# Patient Record
Sex: FEMALE | Race: WHITE | Employment: FULL TIME | URBAN - NONMETROPOLITAN AREA
[De-identification: names, ages, dates, MRNs, and addresses within clinical notes are randomized per-mention and may not be internally consistent; named-entity substitution may affect disease eponyms.]

---

## 2022-02-16 PROBLEM — H65.03 NON-RECURRENT ACUTE SEROUS OTITIS MEDIA OF BOTH EARS: Status: ACTIVE | Noted: 2022-02-16

## 2023-09-19 ENCOUNTER — OFFICE VISIT (OUTPATIENT)
Dept: OBGYN CLINIC | Age: 23
End: 2023-09-19
Payer: COMMERCIAL

## 2023-09-19 VITALS
WEIGHT: 193 LBS | BODY MASS INDEX: 36.44 KG/M2 | SYSTOLIC BLOOD PRESSURE: 100 MMHG | HEIGHT: 61 IN | DIASTOLIC BLOOD PRESSURE: 62 MMHG | HEART RATE: 87 BPM

## 2023-09-19 DIAGNOSIS — F41.9 ANXIETY: ICD-10-CM

## 2023-09-19 DIAGNOSIS — N91.2 AMENORRHEA: Primary | ICD-10-CM

## 2023-09-19 DIAGNOSIS — Z32.00 POSSIBLE PREGNANCY: ICD-10-CM

## 2023-09-19 DIAGNOSIS — N91.2 AMENORRHEA: ICD-10-CM

## 2023-09-19 DIAGNOSIS — Z36.89 CONFIRM FETAL CARDIAC ACTIVITY USING ULTRASOUND: ICD-10-CM

## 2023-09-19 LAB
CONTROL: PRESENT
GONADOTROPIN, CHORIONIC (HCG) QUANT: ABNORMAL MIU/ML (ref 0–5.3)
PREGNANCY TEST URINE, POC: ABNORMAL

## 2023-09-19 PROCEDURE — 99214 OFFICE O/P EST MOD 30 MIN: CPT | Performed by: NURSE PRACTITIONER

## 2023-09-19 RX ORDER — BUSPIRONE HYDROCHLORIDE 5 MG/1
5 TABLET ORAL 2 TIMES DAILY
Qty: 60 TABLET | Refills: 0 | Status: SHIPPED | OUTPATIENT
Start: 2023-09-19 | End: 2023-10-19

## 2023-09-19 RX ORDER — PNV NO.95/FERROUS FUM/FOLIC AC 28MG-0.8MG
1 TABLET ORAL DAILY
Qty: 30 TABLET | Refills: 11 | Status: SHIPPED | OUTPATIENT
Start: 2023-09-19

## 2023-09-19 ASSESSMENT — ENCOUNTER SYMPTOMS
RESPIRATORY NEGATIVE: 1
EYES NEGATIVE: 1
ALLERGIC/IMMUNOLOGIC NEGATIVE: 1
GASTROINTESTINAL NEGATIVE: 1
DIARRHEA: 0
CONSTIPATION: 0

## 2023-09-19 NOTE — PROGRESS NOTES
Pt is here for confirmation states she has cramping and some implantation spotting but not so much this week. She is up to date on her pap.

## 2023-09-26 ENCOUNTER — TELEPHONE (OUTPATIENT)
Dept: OBGYN CLINIC | Age: 23
End: 2023-09-26

## 2023-09-26 NOTE — TELEPHONE ENCOUNTER
Patient called with c/o of brown discharge. Left message informing patient that this can be normal if she recently had intercourse. Also gave patient scheduling number to set up ultrasound.

## 2023-10-04 ENCOUNTER — HOSPITAL ENCOUNTER (OUTPATIENT)
Dept: ULTRASOUND IMAGING | Age: 23
Discharge: HOME | End: 2023-10-04
Payer: COMMERCIAL

## 2023-10-04 DIAGNOSIS — Z36.89 CONFIRM FETAL CARDIAC ACTIVITY USING ULTRASOUND: ICD-10-CM

## 2023-10-04 PROCEDURE — 76817 TRANSVAGINAL US OBSTETRIC: CPT

## 2023-10-09 ENCOUNTER — ROUTINE PRENATAL (OUTPATIENT)
Dept: OBGYN CLINIC | Age: 23
End: 2023-10-09

## 2023-10-09 VITALS
DIASTOLIC BLOOD PRESSURE: 82 MMHG | HEART RATE: 79 BPM | SYSTOLIC BLOOD PRESSURE: 116 MMHG | BODY MASS INDEX: 37.41 KG/M2 | WEIGHT: 198 LBS

## 2023-10-09 DIAGNOSIS — Z3A.10 10 WEEKS GESTATION OF PREGNANCY: ICD-10-CM

## 2023-10-09 DIAGNOSIS — Z34.01 NORMAL FIRST PREGNANCY IN FIRST TRIMESTER: Primary | ICD-10-CM

## 2023-10-09 LAB
ABO/RH: NORMAL
ANTIBODY SCREEN: NORMAL
HCV AB SERPL QL IA: NORMAL

## 2023-10-09 PROCEDURE — 0500F INITIAL PRENATAL CARE VISIT: CPT | Performed by: NURSE PRACTITIONER

## 2023-10-09 NOTE — PROGRESS NOTES
Pt presents today for routine prenatal visit. Pt denies vaginal bleeding, cramping, or leaking of fluid. She states that her step mom said that pregnant women were needing COVID and flu shots. She is not sure who she wants for delivery. She states she is wanting genetic testing and is up to date on her pap.

## 2023-10-12 LAB
BASOPHILS # BLD: 0 K/UL (ref 0–0.2)
BASOPHILS NFR BLD: 0.2 % (ref 0–1)
EOSINOPHIL # BLD: 0 K/UL (ref 0–0.6)
EOSINOPHIL NFR BLD: 0.5 % (ref 0–5)
ERYTHROCYTE [DISTWIDTH] IN BLOOD BY AUTOMATED COUNT: 12.4 % (ref 11.5–14.5)
HBV SURFACE AG SERPL QL IA: ABNORMAL
HCT VFR BLD AUTO: 39.3 % (ref 37–47)
HGB BLD-MCNC: 13.2 G/DL (ref 12–16)
HIV-1 P24 AG: ABNORMAL
HIV1+2 AB SERPLBLD QL IA.RAPID: ABNORMAL
IMM GRANULOCYTES # BLD: 0 K/UL
LYMPHOCYTES # BLD: 2.1 K/UL (ref 1.1–4.5)
LYMPHOCYTES NFR BLD: 24.5 % (ref 20–40)
MCH RBC QN AUTO: 30.4 PG (ref 27–31)
MCHC RBC AUTO-ENTMCNC: 33.6 G/DL (ref 33–37)
MCV RBC AUTO: 90.6 FL (ref 81–99)
MONOCYTES # BLD: 0.6 K/UL (ref 0–0.9)
MONOCYTES NFR BLD: 6.7 % (ref 0–10)
NEUTROPHILS # BLD: 5.9 K/UL (ref 1.5–7.5)
NEUTS SEG NFR BLD: 67.8 % (ref 50–65)
PLATELET # BLD AUTO: 252 K/UL (ref 130–400)
PMV BLD AUTO: 9.8 FL (ref 9.4–12.3)
RBC # BLD AUTO: 4.34 M/UL (ref 4.2–5.4)
RPR SER QL: REACTIVE
RUBV IGG SER-ACNC: REACTIVE [IU]/ML
WBC # BLD AUTO: 8.7 K/UL (ref 4.8–10.8)

## 2023-10-14 LAB — RPR SER-TITR: ABNORMAL {TITER}

## 2023-10-15 LAB — T PALLIDUM AB SER QL AGGL: NON REACTIVE

## 2023-10-16 LAB — VZV IGG SER QL IA: 1.21

## 2023-11-06 ENCOUNTER — ROUTINE PRENATAL (OUTPATIENT)
Dept: OBGYN CLINIC | Age: 23
End: 2023-11-06

## 2023-11-06 VITALS
BODY MASS INDEX: 38.55 KG/M2 | DIASTOLIC BLOOD PRESSURE: 80 MMHG | HEART RATE: 99 BPM | SYSTOLIC BLOOD PRESSURE: 131 MMHG | WEIGHT: 204 LBS

## 2023-11-06 DIAGNOSIS — R76.8 BIOLOGICAL FALSE POSITIVE RPR TEST: ICD-10-CM

## 2023-11-06 DIAGNOSIS — Z3A.14 14 WEEKS GESTATION OF PREGNANCY: ICD-10-CM

## 2023-11-06 DIAGNOSIS — Z34.01 NORMAL FIRST PREGNANCY IN FIRST TRIMESTER: Primary | ICD-10-CM

## 2023-11-06 PROCEDURE — 0502F SUBSEQUENT PRENATAL CARE: CPT | Performed by: NURSE PRACTITIONER

## 2023-11-06 NOTE — PROGRESS NOTES
Pt presents today for routine prenatal visit. Pt denies vaginal bleeding or leaking of fluid. She states she has some cramping sometimes when she rolls over or moves and is wanting to discuss weight.

## 2023-11-15 RX ORDER — BUSPIRONE HYDROCHLORIDE 5 MG/1
5 TABLET ORAL 2 TIMES DAILY
Qty: 60 TABLET | Refills: 11 | Status: SHIPPED | OUTPATIENT
Start: 2023-11-15

## 2023-12-04 ENCOUNTER — TELEPHONE (OUTPATIENT)
Dept: OBGYN CLINIC | Age: 23
End: 2023-12-04

## 2023-12-04 SDOH — ECONOMIC STABILITY: TRANSPORTATION INSECURITY
IN THE PAST 12 MONTHS, HAS LACK OF TRANSPORTATION KEPT YOU FROM MEETINGS, WORK, OR FROM GETTING THINGS NEEDED FOR DAILY LIVING?: NO

## 2023-12-04 SDOH — ECONOMIC STABILITY: INCOME INSECURITY: HOW HARD IS IT FOR YOU TO PAY FOR THE VERY BASICS LIKE FOOD, HOUSING, MEDICAL CARE, AND HEATING?: NOT VERY HARD

## 2023-12-04 SDOH — ECONOMIC STABILITY: FOOD INSECURITY: WITHIN THE PAST 12 MONTHS, YOU WORRIED THAT YOUR FOOD WOULD RUN OUT BEFORE YOU GOT MONEY TO BUY MORE.: NEVER TRUE

## 2023-12-04 SDOH — ECONOMIC STABILITY: FOOD INSECURITY: WITHIN THE PAST 12 MONTHS, THE FOOD YOU BOUGHT JUST DIDN'T LAST AND YOU DIDN'T HAVE MONEY TO GET MORE.: NEVER TRUE

## 2023-12-04 SDOH — ECONOMIC STABILITY: HOUSING INSECURITY
IN THE LAST 12 MONTHS, WAS THERE A TIME WHEN YOU DID NOT HAVE A STEADY PLACE TO SLEEP OR SLEPT IN A SHELTER (INCLUDING NOW)?: NO

## 2023-12-06 ENCOUNTER — ROUTINE PRENATAL (OUTPATIENT)
Dept: OBGYN CLINIC | Age: 23
End: 2023-12-06

## 2023-12-06 VITALS
SYSTOLIC BLOOD PRESSURE: 132 MMHG | DIASTOLIC BLOOD PRESSURE: 85 MMHG | WEIGHT: 203 LBS | HEART RATE: 99 BPM | BODY MASS INDEX: 38.36 KG/M2

## 2023-12-06 DIAGNOSIS — Z34.02 NORMAL FIRST PREGNANCY CONFIRMED, SECOND TRIMESTER: Primary | ICD-10-CM

## 2023-12-06 PROCEDURE — 0502F SUBSEQUENT PRENATAL CARE: CPT | Performed by: NURSE PRACTITIONER

## 2023-12-06 NOTE — PROGRESS NOTES
CNM Prenatal Office Note  Subjective:  Corean Cockayne is here for a return obstetrical visit. Today she is 18w3d weeks EGA. She is doing well, taking her PNV as directed, and has no complaints. She  does not have vaginal bleeding, n/v, syncope, or headaches. Pt does feel fetal movement regularly. Objective: Mother's Prenatal Vitals  BP: 132/85  Weight - Scale: 92.1 kg (203 lb)  Pulse: 99  Patient Position: Sitting  Prenatal Fetal Information  Fetal HR: 150  Movement: Present  Pt is A&Ox3, in no acute distress. Normocephalic, atraumatic. PERRL. Resp even and non-labored. Skin pink, warm & dry. Gravid abdomen. LANDIS's well. Gait steady. Assessment:    IUP at 18w3d wks      Diagnosis Orders   1. Normal first pregnancy confirmed, second trimester          Plan:  Pt counseled on balanced nutrition, adequate fluid intake, taking PNV daily, and exercise along with  upcoming anatomy scan  Continue with routine prenatal care  RTC in 4 wks for prenatal visit      MEDICATIONS:  No orders of the defined types were placed in this encounter. ORDERS:  No orders of the defined types were placed in this encounter. More than 50% of this 20 min visit was education and counseling.

## 2024-01-03 ENCOUNTER — ROUTINE PRENATAL (OUTPATIENT)
Dept: OBGYN CLINIC | Age: 24
End: 2024-01-03

## 2024-01-03 VITALS
BODY MASS INDEX: 38.92 KG/M2 | HEART RATE: 109 BPM | DIASTOLIC BLOOD PRESSURE: 81 MMHG | SYSTOLIC BLOOD PRESSURE: 134 MMHG | WEIGHT: 206 LBS

## 2024-01-03 DIAGNOSIS — F41.9 ANXIETY: ICD-10-CM

## 2024-01-03 DIAGNOSIS — R76.8 BIOLOGICAL FALSE POSITIVE RPR TEST: ICD-10-CM

## 2024-01-03 DIAGNOSIS — Z82.79 FAMILY HISTORY OF CONGENITAL HEART DEFECT: ICD-10-CM

## 2024-01-03 DIAGNOSIS — Z3A.22 22 WEEKS GESTATION OF PREGNANCY: ICD-10-CM

## 2024-01-03 DIAGNOSIS — Z34.02 ENCOUNTER FOR SUPERVISION OF NORMAL FIRST PREGNANCY IN SECOND TRIMESTER: Primary | ICD-10-CM

## 2024-01-03 PROCEDURE — 0502F SUBSEQUENT PRENATAL CARE: CPT | Performed by: NURSE PRACTITIONER

## 2024-01-03 NOTE — PROGRESS NOTES
Pt is here for routine parental care. Pt denies vaginal bleeding, cramping or leaking of fluid. Pt states + fetal movement.

## 2024-01-03 NOTE — PROGRESS NOTES
[facility] Prenatal Office Note  Subjective:  Valentine Hicks is here for a return obstetrical visit. Today she is 22w3d weeks EGA. She is doing well, taking her PNV as directed, and has no complaints.  She  does not have vaginal bleeding, n/v, syncope, or headaches.  Pt does feel fetal movement regularly.  Pt did have her anatomy scan today. Pt does complain of some sleeping difficulties.     Objective:  Mother's Prenatal Vitals  BP: 134/81  Weight - Scale: 93.4 kg (206 lb)  Pulse: (!) 109  Patient Position: Sitting  Prenatal Fetal Information  Fetal HR:   Movement: Present  Cervical Exam  Presentation: Cephalic  Pt is A&Ox3, in no acute distress. Normocephalic, atraumatic. PERRL. Resp even and non-labored. Skin pink, warm & dry. Gravid abdomen. LANDIS's well. Gait steady.   Assessment:    IUP at 22w3d wks      Diagnosis Orders   1. Encounter for supervision of normal first pregnancy in second trimester  Glucose 1 Hour Postprandial    CBC with Auto Differential      2. 22 weeks gestation of pregnancy        3. Anxiety        4. Biological false positive RPR test        5. Family history of congenital heart defect          Plan:  Pt counseled on balanced nutrition, adequate fluid intake, taking PNV daily, and exercise along with labor precautions, GHTN precautions, Kick count,  labor, and Genetic testing  Upcoming Glucose screening   Continue with routine prenatal care.  Return in about 4 weeks (around 2024) for prenatal.       MEDICATIONS:  No orders of the defined types were placed in this encounter.    ORDERS:  Orders Placed This Encounter   Procedures    Glucose 1 Hour Postprandial    CBC with Auto Differential       More than 50% of this 20 min visit was education and counseling.    I FILOMENA Meek, am scribing for and in the presence of Tiffany Ramos CNM.  1/10/2024 11:38 PM     I have seen and examined the patient independently.  I reviewed all laboratory and imaging studies that are relevant.

## 2024-01-03 NOTE — PATIENT INSTRUCTIONS
Patient Education        Weeks 22 to 26 of Your Pregnancy: Care Instructions  Your baby's lungs are getting ready for breathing. Your baby may respond to your voice. Your baby likely turns less, and kicks or jerks more. Jerking may mean that your baby has hiccups.    Think about taking childbirth classes. And start to think about whether you want to have pain medicine during labor.   At your next doctor visit, you may be tested for anemia and for high blood sugar that first occurs during pregnancy (gestational diabetes). These conditions can cause problems for you and your baby.     To ease discomfort, such as back pain    Change your position often. Try not to sit or stand for too long.  Get some exercise. Things like walking or stretching may help.  Try using a heating pad or cold pack.    To ease or reduce swelling in your feet, ankles, hands, and fingers    Take off your rings.  Avoid high-sodium foods, such as potato chips.  Prop up your feet, and sleep with pillows under your feet.  Try to avoid standing for long periods of time.  Do not wear tight shoes.  Wear support stockings.  Kegel exercises to prevent urine from leaking    Squeeze your muscles as if you were trying not to pass gas. Your belly, legs, and buttocks shouldn't move. Hold the squeeze for 3 seconds, then relax for 5 to 10 seconds.    Add 1 second each week until you can squeeze for 10 seconds. Repeat the exercise 10 times a session. Do 3 to 8 sessions a day. If these exercises cause you pain, stop doing them and talk with your doctor.  Follow-up care is a key part of your treatment and safety. Be sure to make and go to all appointments, and call your doctor if you are having problems. It's also a good idea to know your test results and keep a list of the medicines you take.  Where can you learn more?  Go to https://www.healthwise.net/patientEd and enter G264 to learn more about \"Weeks 22 to 26 of Your Pregnancy: Care Instructions.\"  Current as

## 2024-01-09 ENCOUNTER — TELEPHONE (OUTPATIENT)
Dept: OBGYN CLINIC | Age: 24
End: 2024-01-09

## 2024-01-09 NOTE — TELEPHONE ENCOUNTER
Valentine called to reschedule an appointment for Follow Up for her prenatal and ultrasound that is on 1/31/24. [facility] was unable to accommodate in the time frame needed. Please be advised that the best time to call Anytime.    Thank you.

## 2024-01-13 ENCOUNTER — OFFICE VISIT (OUTPATIENT)
Age: 24
End: 2024-01-13
Payer: COMMERCIAL

## 2024-01-13 VITALS
RESPIRATION RATE: 20 BRPM | BODY MASS INDEX: 39.53 KG/M2 | WEIGHT: 209.2 LBS | SYSTOLIC BLOOD PRESSURE: 122 MMHG | HEART RATE: 88 BPM | DIASTOLIC BLOOD PRESSURE: 72 MMHG | OXYGEN SATURATION: 98 % | TEMPERATURE: 97.3 F

## 2024-01-13 DIAGNOSIS — B35.4 TINEA CORPORIS: Primary | ICD-10-CM

## 2024-01-13 DIAGNOSIS — Z86.19 HISTORY OF TINEA CORPORIS: ICD-10-CM

## 2024-01-13 PROCEDURE — 99203 OFFICE O/P NEW LOW 30 MIN: CPT

## 2024-01-13 RX ORDER — KETOCONAZOLE 20 MG/G
CREAM TOPICAL
Qty: 30 G | Refills: 0 | Status: SHIPPED | OUTPATIENT
Start: 2024-01-13

## 2024-01-13 ASSESSMENT — ENCOUNTER SYMPTOMS
EYE REDNESS: 0
BACK PAIN: 0
COUGH: 0
CHEST TIGHTNESS: 0
SINUS PAIN: 0
SHORTNESS OF BREATH: 0
WHEEZING: 0
VOMITING: 0
EYE DISCHARGE: 0
DIARRHEA: 0
SORE THROAT: 0
NAUSEA: 0
CONSTIPATION: 0
EYE ITCHING: 0
RHINORRHEA: 0
ABDOMINAL PAIN: 0
ALLERGIC/IMMUNOLOGIC NEGATIVE: 1

## 2024-01-13 NOTE — PROGRESS NOTES
[facility]  [facility]  [address] [place] [address] [address]IVE  [place] [address]  Dept: [phone]  Dept Fax: [phone]  Loc: [phone]    Valentine Hicks is a 23 y.o. female who presents today for her medical conditions/complaints as noted below.  Valentine Hicks is complaining of Skin Problem (Itchy, redness patch since this mor)    HPI:     Patient ambulatory to clinic for complaint of skin concern to left breast. She states she awoke this morning to raised red patch on left breast. Mild pruritus. Denies discharge or drainage. States she had ringworm in 4th grade on one of her breasts but does not remember which one. Has not taken any medications for her symptoms. She also reports moving; therefore her laundry has not been as clean as usual. Patient is 23w 6d pregnant. Stable.     Past Medical History:   Diagnosis Date    ADHD (attention deficit hyperactivity disorder)     Anxiety 1/14/2019     History reviewed. No pertinent surgical history.    History reviewed. No pertinent family history.    Social History     Tobacco Use    Smoking status: Never    Smokeless tobacco: Never   Substance Use Topics    Alcohol use: No      Current Outpatient Medications   Medication Sig Dispense Refill    ketoconazole (NIZORAL) 2 % cream Apply to affected area two times daily for 4 weeks 30 g 0    busPIRone (BUSPAR) 5 MG tablet TAKE ONE TABLET BY MOUTH 2 TIMES A DAY 60 tablet 11    Prenatal Vit-Fe Fumarate-FA (PRENATAL VITAMINS) 28-0.8 MG TABS Take 1 tablet by mouth daily 30 tablet 11     No current facility-administered medications for this visit.     No Known Allergies    Health Maintenance   Topic Date Due    Hepatitis B vaccine (1 of 3 - 3-dose series) Never done    Varicella vaccine (1 of 2 - 2-dose childhood series) Never done    HPV vaccine (1 - 2-dose series) Never done    Depression Screen  Never done    HIV screen  Never done    DTaP/Tdap/Td vaccine (1 - Tdap) Never done    Pap

## 2024-01-13 NOTE — PATIENT INSTRUCTIONS
Ketoconazole cream prescribed.  Avoid touching/scratching the area.  Wash hands frequently.  Wash all clothes/linens in hot water.  Contact precautions  Follow up with PCP or return to clinic if symptoms worsening or not improving.

## 2024-02-01 ENCOUNTER — ROUTINE PRENATAL (OUTPATIENT)
Dept: OBGYN CLINIC | Age: 24
End: 2024-02-01

## 2024-02-01 VITALS
SYSTOLIC BLOOD PRESSURE: 111 MMHG | BODY MASS INDEX: 39.68 KG/M2 | WEIGHT: 210 LBS | HEART RATE: 92 BPM | DIASTOLIC BLOOD PRESSURE: 76 MMHG

## 2024-02-01 DIAGNOSIS — Z34.02 ENCOUNTER FOR SUPERVISION OF NORMAL FIRST PREGNANCY IN SECOND TRIMESTER: Primary | ICD-10-CM

## 2024-02-01 DIAGNOSIS — Z34.02 ENCOUNTER FOR SUPERVISION OF NORMAL FIRST PREGNANCY IN SECOND TRIMESTER: ICD-10-CM

## 2024-02-01 DIAGNOSIS — Z3A.26 26 WEEKS GESTATION OF PREGNANCY: ICD-10-CM

## 2024-02-01 DIAGNOSIS — Z82.79 FAMILY HISTORY OF CONGENITAL HEART DEFECT: ICD-10-CM

## 2024-02-01 DIAGNOSIS — R76.8 BIOLOGICAL FALSE POSITIVE RPR TEST: ICD-10-CM

## 2024-02-01 DIAGNOSIS — Z13.32 ENCOUNTER FOR SCREENING FOR MATERNAL DEPRESSION: ICD-10-CM

## 2024-02-01 DIAGNOSIS — Z71.89 ENCOUNTER FOR ANTEPARTUM CONSULTATION REGARDING LACTATION: ICD-10-CM

## 2024-02-01 LAB
BASOPHILS # BLD: 0 K/UL (ref 0–0.2)
BASOPHILS NFR BLD: 0.2 % (ref 0–1)
EOSINOPHIL # BLD: 0 K/UL (ref 0–0.6)
EOSINOPHIL NFR BLD: 0.5 % (ref 0–5)
ERYTHROCYTE [DISTWIDTH] IN BLOOD BY AUTOMATED COUNT: 12.8 % (ref 11.5–14.5)
GLUCOSE 1H P MEAL SERPL-MCNC: 99 MG/DL (ref 75–140)
HCT VFR BLD AUTO: 36.8 % (ref 37–47)
HGB BLD-MCNC: 12.5 G/DL (ref 12–16)
IMM GRANULOCYTES # BLD: 0.1 K/UL
LYMPHOCYTES # BLD: 1.6 K/UL (ref 1.1–4.5)
LYMPHOCYTES NFR BLD: 20.1 % (ref 20–40)
MCH RBC QN AUTO: 30.4 PG (ref 27–31)
MCHC RBC AUTO-ENTMCNC: 34 G/DL (ref 33–37)
MCV RBC AUTO: 89.5 FL (ref 81–99)
MONOCYTES # BLD: 0.5 K/UL (ref 0–0.9)
MONOCYTES NFR BLD: 6.4 % (ref 0–10)
NEUTROPHILS # BLD: 5.9 K/UL (ref 1.5–7.5)
NEUTS SEG NFR BLD: 72.2 % (ref 50–65)
PLATELET # BLD AUTO: 237 K/UL (ref 130–400)
PMV BLD AUTO: 9.5 FL (ref 9.4–12.3)
RBC # BLD AUTO: 4.11 M/UL (ref 4.2–5.4)
WBC # BLD AUTO: 8.1 K/UL (ref 4.8–10.8)

## 2024-02-01 PROCEDURE — 0502F SUBSEQUENT PRENATAL CARE: CPT | Performed by: NURSE PRACTITIONER

## 2024-02-01 PROCEDURE — S3005 EVAL SELF-ASSESS DEPRESSION: HCPCS | Performed by: NURSE PRACTITIONER

## 2024-02-01 NOTE — PROGRESS NOTES
CNM Prenatal Office Note  Subjective:  Valentine Hicks is here for a return obstetrical visit. Today she is 26w4d weeks EGA. She is doing well, taking her PNV as directed, and has no complaints.  She  does not have vaginal bleeding, n/v, syncope, or headaches.  Pt does feel fetal movement regularly. 1 hour GCT today. Rhogam A pos  Objective:  Mother's Prenatal Vitals  BP: 111/76  Weight - Scale: 95.3 kg (210 lb)  Pulse: 92  Patient Position: Sitting  Prenatal Fetal Information  Fundal Height (cm): 29 cm  Fetal HR: 150- u/s  Movement: Present  Pt is A&Ox3, in no acute distress. Normocephalic, atraumatic. PERRL. Resp even and non-labored. Skin pink, warm & dry. Gravid abdomen. LANDIS's well. Gait steady. EPDS Screenin  Assessment:    IUP at 26w4d wks      Diagnosis Orders   1. Encounter for supervision of normal first pregnancy in second trimester        2. 26 weeks gestation of pregnancy        3. Biological false positive RPR test        4. Family history of congenital heart defect        5. Encounter for screening for maternal depression  MA EVAL SELF-ASSESS DEPRESSION      6. Encounter for antepartum consultation regarding lactation          Plan:  Third trimester teaching completed including warning signs for pre-eclampsia (blurred vision, seeing spots/sparkles, sudden increased weight gain or profound edema, epigastric pain), FKC (decreased fetal movments),  labor ( contractions or watery discharge, vaginal bleeding, cramping), n/v, chills, and or fever. Instructed pt to come to office or go to LDR if these symptoms presented. Pt voiced understanding.     Pt counseled on balanced nutrition, adequate fluid intake, taking PNV daily, and exercise along with GHTN precautions, Kick count, and  labor  Continue with routine prenatal care.  Return in about 2 weeks (around 2/15/2024) for prenatal.   Pt states her mood is good, she recently has been able to get WIC and medicaid. Pt states that will

## 2024-02-01 NOTE — PROGRESS NOTES
Pt is here for routine parental care. Pt denies vaginal bleeding, cramping or leaking of fluid. Pt states + fetal movement. She has order a breast pump.

## 2024-02-09 PROBLEM — Z82.79 FAMILY HISTORY OF CONGENITAL HEART DEFECT: Status: ACTIVE | Noted: 2024-02-09

## 2024-02-15 ENCOUNTER — ROUTINE PRENATAL (OUTPATIENT)
Dept: OBGYN CLINIC | Age: 24
End: 2024-02-15

## 2024-02-15 VITALS
DIASTOLIC BLOOD PRESSURE: 77 MMHG | SYSTOLIC BLOOD PRESSURE: 114 MMHG | WEIGHT: 211 LBS | BODY MASS INDEX: 39.87 KG/M2 | HEART RATE: 80 BPM

## 2024-02-15 DIAGNOSIS — O99.210 OBESITY AFFECTING PREGNANCY, ANTEPARTUM, UNSPECIFIED OBESITY TYPE: ICD-10-CM

## 2024-02-15 DIAGNOSIS — Z82.79 FAMILY HISTORY OF CONGENITAL HEART DEFECT: ICD-10-CM

## 2024-02-15 DIAGNOSIS — Z34.03 ENCOUNTER FOR SUPERVISION OF NORMAL FIRST PREGNANCY IN THIRD TRIMESTER: ICD-10-CM

## 2024-02-15 DIAGNOSIS — R76.8 BIOLOGICAL FALSE POSITIVE RPR TEST: ICD-10-CM

## 2024-02-15 DIAGNOSIS — Z3A.28 28 WEEKS GESTATION OF PREGNANCY: Primary | ICD-10-CM

## 2024-02-15 PROCEDURE — 0502F SUBSEQUENT PRENATAL CARE: CPT | Performed by: NURSE PRACTITIONER

## 2024-02-15 NOTE — PROGRESS NOTES
CNM Prenatal Office Note  Subjective:  Valentnie Hicks is here for a return obstetrical visit. Today she is 28w4d weeks EGA. She is doing well, taking her PNV as directed, and has no complaints.  She  does not have vaginal bleeding, n/v, syncope, or headaches.  Pt does feel fetal movement regularly.    Objective:  Mother's Prenatal Vitals  BP: 114/77  Weight - Scale: 95.7 kg (211 lb)  Pulse: 80  Patient Position: Sitting  Prenatal Fetal Information  Movement: Present  Pt is A&Ox3, in no acute distress. Normocephalic, atraumatic. PERRL. Resp even and non-labored. Skin pink, warm & dry. Gravid abdomen. LANDIS's well. Gait steady.   Assessment:    IUP at 28w4d wks      Diagnosis Orders   1. 28 weeks gestation of pregnancy        2. Biological false positive RPR test        3. Family history of congenital heart defect        4. Encounter for supervision of normal first pregnancy in third trimester        5. Obesity affecting pregnancy, antepartum, unspecified obesity type          Plan:  Pt counseled on balanced nutrition, adequate fluid intake, taking PNV daily, and exercise along with GHTN precautions, Kick count, and  labor  Continue with routine prenatal care.   surveillance indicated for obesity   next growth  Return in about 2 weeks (around 2024) for Prenatal.     MEDICATIONS:  No orders of the defined types were placed in this encounter.    ORDERS:  No orders of the defined types were placed in this encounter.      More than 50% of this 20 min visit was education and counseling.

## 2024-02-15 NOTE — PATIENT INSTRUCTIONS
Patient Education        Weeks 26 to 30 of Your Pregnancy: Care Instructions  You're starting your last trimester. You'll probably feel your baby moving around more. Your back may ache as your body gets used to your baby's size and length. Take care of yourself, and pay attention to what your body needs.    Talk to your doctor about getting the Tdap shot. It will help protect your  against whooping cough (pertussis). Also ask your doctor about flu and COVID-19 shots if you haven't had them yet. If your blood type is Rh negative, you may be given a shot of Rh immune globulin (such as RhoGAM). It can help prevent problems for your baby.   You may have Micro-Altman contractions. They are single or several strong contractions without a pattern. These are practice contractions but not the start of labor.   Be kind to yourself.     Take breaks when you're tired.  Change positions often. Don't sit for too long or stand for too long.  At work, rest during breaks if you can. If you don't get breaks, talk to your doctor about writing a letter to your employer to request them.  Avoid fumes, chemicals, and tobacco smoke.  Be sexual if you want to.     You may be interested in sex, or you may not. Everyone is different.  Sex is okay unless your doctor tells you not to.  Your belly can make it hard to find good positions for sex. Mountain and explore.  Watch for signs of  labor.    These signs include:   Menstrual-like cramps. Or you may have pain or pressure in your pelvis that happens in a pattern.  About 6 or more contractions in an hour (even after rest and a glass of water).  A low, dull backache that doesn't go away when you change positions.  An increase or change in vaginal discharge.  Light vaginal bleeding or spotting.  Your water breaking.  Know what to do if you think you are having contractions.     Drink 1 or 2 glasses of water.  Lie down on your left side for at least an hour.  While on your side,

## 2024-02-29 ENCOUNTER — ROUTINE PRENATAL (OUTPATIENT)
Dept: OBGYN CLINIC | Age: 24
End: 2024-02-29

## 2024-02-29 VITALS
DIASTOLIC BLOOD PRESSURE: 80 MMHG | SYSTOLIC BLOOD PRESSURE: 130 MMHG | BODY MASS INDEX: 40.06 KG/M2 | WEIGHT: 212 LBS | HEART RATE: 87 BPM

## 2024-02-29 DIAGNOSIS — Z3A.30 30 WEEKS GESTATION OF PREGNANCY: ICD-10-CM

## 2024-02-29 DIAGNOSIS — R76.8 BIOLOGICAL FALSE POSITIVE RPR TEST: ICD-10-CM

## 2024-02-29 DIAGNOSIS — O99.210 OBESITY AFFECTING PREGNANCY, ANTEPARTUM, UNSPECIFIED OBESITY TYPE: ICD-10-CM

## 2024-02-29 DIAGNOSIS — Z82.79 FAMILY HISTORY OF CONGENITAL HEART DEFECT: ICD-10-CM

## 2024-02-29 DIAGNOSIS — Z34.03 ENCOUNTER FOR SUPERVISION OF NORMAL FIRST PREGNANCY IN THIRD TRIMESTER: Primary | ICD-10-CM

## 2024-02-29 PROCEDURE — 0502F SUBSEQUENT PRENATAL CARE: CPT | Performed by: NURSE PRACTITIONER

## 2024-02-29 NOTE — PROGRESS NOTES
CNM Prenatal Office Note  Subjective:  Valentine Hicks is here for a return obstetrical visit. Today she is 30w4d weeks EGA. She is doing well, taking her PNV as directed, and has no complaints.  She  does not have vaginal bleeding, n/v, syncope, or headaches.  Pt does feel fetal movement regularly.    Objective:  Mother's Prenatal Vitals  BP: 130/80  Weight - Scale: 96.2 kg (212 lb)  Pulse: 87  Patient Position: Sitting  Prenatal Fetal Information  Fundal Height (cm): 32 cm  Fetal HR: 158  Movement: Present  Pt is A&Ox3, in no acute distress. Normocephalic, atraumatic. PERRL. Resp even and non-labored. Skin pink, warm & dry. Gravid abdomen. LANDIS's well. Gait steady.   Assessment:    IUP at 30w4d wks      Diagnosis Orders   1. Encounter for supervision of normal first pregnancy in third trimester        2. 30 weeks gestation of pregnancy        3. Biological false positive RPR test        4. Family history of congenital heart defect        5. Obesity affecting pregnancy, antepartum, unspecified obesity type          Plan:  Pt counseled on balanced nutrition, adequate fluid intake, taking PNV daily, and exercise along with GHTN precautions, Kick count, and  labor  Continue with routine prenatal care.   surveillance not indicated  Repeat anatomy reviewed and complete  Return in about 2 weeks (around 3/14/2024) for prenatal.     MEDICATIONS:  No orders of the defined types were placed in this encounter.    ORDERS:  No orders of the defined types were placed in this encounter.      More than 50% of this 20 min visit was education and counseling.

## 2024-02-29 NOTE — PATIENT INSTRUCTIONS
Patient Education        Weeks 30 to 32 of Your Pregnancy: Care Instructions  Your baby is growing more every day. Its eyes can open and close, and it may have hair on its head. Your baby may sleep 20 to 45 minutes at a time and is more active at certain times.    You should feel your baby move several times every day. Your baby now turns less and kicks more.   This is a good time to tour your hospital or birthing center. You may also want to find childcare if needed.     To ease heartburn    Avoid foods that make your symptoms worse, such as chocolate, spicy foods, and caffeine.  Avoid bending over or lying down after meals.  Do not eat for 2 hours before bedtime.  Take antacids like Tums, but don't take ones that have sodium bicarbonate, magnesium trisilicate, or aspirin.    To care for large, swollen veins (varicose veins)    Try to avoid standing for long periods of time.  Sit with your feet propped up.  Wear support hose.  Get some exercise every day, like walking or swimming.  Counting your baby's kicks  Your doctor may ask you to count your baby's movements, such as kicks, flutters, or rolls.    Find a quiet place, and get comfortable. Write down your start time. Count your baby's movements (except hiccups). When your baby has moved 10 times, you can stop counting. Write down how many minutes it took.   If an hour goes by and you don't feel 10 movements, have something to eat or drink. Count for another hour. If you don't feel at least 10 movements in the 2-hour period, call your doctor.   Follow-up care is a key part of your treatment and safety. Be sure to make and go to all appointments, and call your doctor if you are having problems. It's also a good idea to know your test results and keep a list of the medicines you take.  Where can you learn more?  Go to https://www.[employer].net/patientEd and enter X471 to learn more about \"Weeks 30 to 32 of Your Pregnancy: Care Instructions.\"  Current as of: July

## 2024-02-29 NOTE — PROGRESS NOTES
Pt is here for routine parental care. Pt denies vaginal bleeding, cramping or leaking of fluid. Pt states + fetal movement. She noticed a dry eczema patch around the nipple area, she went to pcp and they gave her topical cream and did it for 2 weeks it didn't go away but didn't get worse.

## 2024-03-14 ENCOUNTER — ROUTINE PRENATAL (OUTPATIENT)
Dept: OBGYN CLINIC | Age: 24
End: 2024-03-14

## 2024-03-14 VITALS
BODY MASS INDEX: 40.62 KG/M2 | WEIGHT: 215 LBS | SYSTOLIC BLOOD PRESSURE: 119 MMHG | HEART RATE: 98 BPM | DIASTOLIC BLOOD PRESSURE: 78 MMHG

## 2024-03-14 DIAGNOSIS — Z34.03 ENCOUNTER FOR SUPERVISION OF NORMAL FIRST PREGNANCY IN THIRD TRIMESTER: ICD-10-CM

## 2024-03-14 DIAGNOSIS — Z82.79 FAMILY HISTORY OF CONGENITAL HEART DEFECT: ICD-10-CM

## 2024-03-14 DIAGNOSIS — L30.9 DERMATITIS: ICD-10-CM

## 2024-03-14 DIAGNOSIS — O99.210 OBESITY AFFECTING PREGNANCY, ANTEPARTUM, UNSPECIFIED OBESITY TYPE: ICD-10-CM

## 2024-03-14 DIAGNOSIS — Z3A.32 32 WEEKS GESTATION OF PREGNANCY: Primary | ICD-10-CM

## 2024-03-14 DIAGNOSIS — R76.8 BIOLOGICAL FALSE POSITIVE RPR TEST: ICD-10-CM

## 2024-03-14 PROCEDURE — 0502F SUBSEQUENT PRENATAL CARE: CPT | Performed by: NURSE PRACTITIONER

## 2024-03-14 NOTE — PROGRESS NOTES
[facility] Prenatal Office Note  Subjective:  Valentine Hicks is here for a return obstetrical visit. Today she is 32w4d weeks EGA. She is doing well, taking her PNV as directed, and has no complaints.  She  does not have vaginal bleeding, n/v, syncope, or headaches.  Pt does feel fetal movement regularly.    Objective:  Mother's Prenatal Vitals  BP: 119/78  Weight - Scale: 97.5 kg (215 lb)  Pulse: 98  Patient Position: Sitting  Pt is A&Ox3, in no acute distress. Normocephalic, atraumatic. PERRL. Resp even and non-labored. Skin pink, warm & dry. Gravid abdomen. LANDIS's well. Gait steady.   Assessment:    IUP at 32w4d wks      Diagnosis Orders   1. 32 weeks gestation of pregnancy        2. Biological false positive RPR test        3. Family history of congenital heart defect        4. Encounter for supervision of normal first pregnancy in third trimester        5. Obesity affecting pregnancy, antepartum, unspecified obesity type        6. Dermatitis - dermatitis on left breast, I do recommend hydrocortisone cream.          Plan:  Pt counseled on balanced nutrition, adequate fluid intake, taking PNV daily, and exercise along with GHTN precautions, Kick count, and  labor  Continue with routine prenatal care.   surveillance not indicated  Return in about 2 weeks (around 3/28/2024) for prenatal.     MEDICATIONS:  No orders of the defined types were placed in this encounter.    ORDERS:  No orders of the defined types were placed in this encounter.      More than 50% of this 20 min visit was education and counseling.      I FILOMENA Meek, am scribing for and in the presence of SOLOMON Dowell.  3/14/24  10:49 AM CDT     I have seen and examined the patient independently.  I reviewed all laboratory and imaging studies that are relevant.  I have reviewed and made any appropriate changes to the HPI.    Electronically signed by LEYDA Alva CNM on 3/19/24  at 11:01 PM CDT

## 2024-03-27 ENCOUNTER — ROUTINE PRENATAL (OUTPATIENT)
Dept: OBGYN CLINIC | Age: 24
End: 2024-03-27

## 2024-03-27 VITALS
DIASTOLIC BLOOD PRESSURE: 81 MMHG | WEIGHT: 221 LBS | SYSTOLIC BLOOD PRESSURE: 127 MMHG | HEART RATE: 73 BPM | BODY MASS INDEX: 41.76 KG/M2

## 2024-03-27 DIAGNOSIS — Z82.79 FAMILY HISTORY OF CONGENITAL HEART DEFECT: ICD-10-CM

## 2024-03-27 DIAGNOSIS — M79.89 SWELLING OF BOTH HANDS: ICD-10-CM

## 2024-03-27 DIAGNOSIS — Z34.03 ENCOUNTER FOR SUPERVISION OF NORMAL FIRST PREGNANCY IN THIRD TRIMESTER: ICD-10-CM

## 2024-03-27 DIAGNOSIS — R76.8 BIOLOGICAL FALSE POSITIVE RPR TEST: ICD-10-CM

## 2024-03-27 DIAGNOSIS — Z3A.34 34 WEEKS GESTATION OF PREGNANCY: Primary | ICD-10-CM

## 2024-03-27 DIAGNOSIS — O99.210 OBESITY AFFECTING PREGNANCY, ANTEPARTUM, UNSPECIFIED OBESITY TYPE: ICD-10-CM

## 2024-03-27 NOTE — PATIENT INSTRUCTIONS
and keep a list of the medicines you take.  Where can you learn more?  Go to https://www.[facility].net/patientEd and enter B912 to learn more about \"Weeks 34 to 36 of Your Pregnancy: Care Instructions.\"  Current as of: July 10, 2023               Content Version: 14.0  © 1727-1886 [employer].   Care instructions adapted under license by [employer]. If you have questions about a medical condition or this instruction, always ask your healthcare professional. [employer] disclaims any warranty or liability for your use of this information.       Patient Education        Counting Your Baby's Kicks: Care Instructions  Overview     Counting your baby's kicks is one way your doctor can tell that your baby is healthy. You will probably feel your baby move for the first time between 16 and 22 weeks. The movement may feel like flutters rather than kicks. Your baby may move more at certain times of the day. When you are active, you may notice less kicking than when you are resting. At your prenatal visits, your doctor will ask whether the baby is active.  In your last trimester, your doctor may ask you to count the number of times you feel your baby move.  Follow-up care is a key part of your treatment and safety. Be sure to make and go to all appointments, and call your doctor if you are having problems. It's also a good idea to know your test results and keep a list of the medicines you take.  How do you count fetal kicks?  A common method of checking your baby's movement is to note the length of time it takes to count 10 movements (such as kicks, flutters, or rolls).  Pick your baby's most active time of day to count. This may be any time from morning to evening.  If you don't feel 10 movements in an hour, have something to eat or drink and count for another hour. If you don't feel at least 10 movements in the 2-hour period, call your doctor.  Do not use an at-home Doppler heart monitor in

## 2024-03-27 NOTE — PROGRESS NOTES
Pt is here for routine parental care. Pt denies vaginal bleeding, cramping or leaking of fluid. Pt states + fetal movement. She complains of her hands swelling.

## 2024-03-27 NOTE — PROGRESS NOTES
[facility] Prenatal Office Note  Subjective:  Valentine Hicks is here for a return obstetrical visit. Today she is 34w3d weeks EGA. She is doing well, taking her PNV as directed, and has no complaints.  She  does not have vaginal bleeding, n/v, syncope, or headaches.  Pt does feel fetal movement regularly.    Objective:  Mother's Prenatal Vitals  BP: 127/81  Weight - Scale: 100.2 kg (221 lb)  Pulse: 73  Patient Position: Sitting  Prenatal Fetal Information  Movement: Present  Pt is A&Ox3, in no acute distress. Normocephalic, atraumatic. PERRL. Resp even and non-labored. Skin pink, warm & dry. Gravid abdomen. LANDIS's well. Gait steady.   Assessment:    IUP at 34w3d wks      Diagnosis Orders   1. 34 weeks gestation of pregnancy        2. Biological false positive RPR test        3. Family history of congenital heart defect        4. Encounter for supervision of normal first pregnancy in third trimester        5. Obesity affecting pregnancy, antepartum, unspecified obesity type        6. Swelling of both hands          Plan:  Pt counseled on balanced nutrition, adequate fluid intake, taking PNV daily, and exercise along with GHTN precautions, Kick count, and  labor  Continue with routine prenatal care.   surveillance not indicated  Return in about 2 weeks (around 4/10/2024) for prenatal.     MEDICATIONS:  No orders of the defined types were placed in this encounter.    ORDERS:  No orders of the defined types were placed in this encounter.      More than 50% of this 20 min visit was education and counseling.      I FILOMENA Meek, am scribing for and in the presence of SOLOMON Dowell.  3/27/24  11:21 AM CDT     I have seen and examined the patient independently.  I reviewed all laboratory and imaging studies that are relevant.  I have reviewed and made any appropriate changes to the HPI.    Electronically signed by LEYDA Alva CNM on 3/31/24  at 7:58 PM CDT

## 2024-04-05 ENCOUNTER — ROUTINE PRENATAL (OUTPATIENT)
Dept: OBGYN CLINIC | Age: 24
End: 2024-04-05

## 2024-04-05 VITALS
BODY MASS INDEX: 41.76 KG/M2 | HEART RATE: 96 BPM | SYSTOLIC BLOOD PRESSURE: 139 MMHG | DIASTOLIC BLOOD PRESSURE: 77 MMHG | WEIGHT: 221 LBS

## 2024-04-05 DIAGNOSIS — Z11.3 SCREENING EXAMINATION FOR STD (SEXUALLY TRANSMITTED DISEASE): ICD-10-CM

## 2024-04-05 DIAGNOSIS — O99.210 OBESITY AFFECTING PREGNANCY, ANTEPARTUM, UNSPECIFIED OBESITY TYPE: ICD-10-CM

## 2024-04-05 DIAGNOSIS — Z82.79 FAMILY HISTORY OF CONGENITAL HEART DEFECT: ICD-10-CM

## 2024-04-05 DIAGNOSIS — Z3A.35 35 WEEKS GESTATION OF PREGNANCY: ICD-10-CM

## 2024-04-05 DIAGNOSIS — Z34.03 ENCOUNTER FOR SUPERVISION OF NORMAL FIRST PREGNANCY IN THIRD TRIMESTER: Primary | ICD-10-CM

## 2024-04-05 DIAGNOSIS — Z36.85 ANTENATAL SCREENING FOR STREPTOCOCCUS B: ICD-10-CM

## 2024-04-05 DIAGNOSIS — R76.8 BIOLOGICAL FALSE POSITIVE RPR TEST: ICD-10-CM

## 2024-04-05 LAB
C TRACH DNA CVX QL NAA+PROBE: NOT DETECTED
N GONORRHOEA DNA CERV MUCUS QL NAA+PROBE: NOT DETECTED
T VAGINALIS DNA GENITAL QL NAA+PROBE: NOT DETECTED

## 2024-04-05 PROCEDURE — 0502F SUBSEQUENT PRENATAL CARE: CPT | Performed by: NURSE PRACTITIONER

## 2024-04-05 NOTE — PROGRESS NOTES
Pt presents today for routine prenatal visit. Pt denies vaginal bleeding, cramping, or leaking of fluid +fetal movement. She states this morning she was sitting with her leg crossed and then had indin.

## 2024-04-05 NOTE — PROGRESS NOTES
Subjective:Valetnine Hicks is here for a return obstetrical visit. Today she is 35w5d weeks EGA. Pt does feel fetal movement regularly.  Having occasional contractions. Denies any bleeding or LOF.    Problems/Complaints today:  Routine PNC  Objective:Mother's Prenatal Vitals  BP: 139/77  Weight - Scale: 100.2 kg (221 lb)  Pulse: 96  Patient Position: Sitting  Prenatal Fetal Information  Fundal Height (cm): 37 cm  Fetal HR: 138  Movement: Present  Cervical Exam  Dilation (cm): Closed  Effacement: 50  Station: -3  Station (Labor Curve Graph): 8  Presentation: Vertex  Dil/Eff/Sta  Dilation (cm): Closed  Effacement: 50  Station: -3  Pt is A&Ox3, in no acute distress. Normocephalic, atraumatic. PERRL. Resp even and non-labored. Skin pink, warm & dry. Gravid abdomen. LANDIS's well. Gait steady.   Assessment:  IUP at 35w5d wks      Diagnosis Orders   1. Encounter for supervision of normal first pregnancy in third trimester        2. Family history of congenital heart defect        3. Obesity affecting pregnancy, antepartum, unspecified obesity type        4. 35 weeks gestation of pregnancy        5.  screening for streptococcus B  Culture, Strep B Screen, Vaginal/Rectal      6. Screening examination for STD (sexually transmitted disease)  C.trachomatis, N.gonorrhoeae, T.vaginalis Molecular, Thin Prep      7. Biological false positive RPR test          Plan:  Problems/Complaints Management Plan:  Routine PNC- GBS and STD screening done  Routine OB Management Plan:  Pt counseled on labor precautions, GHTN precautions, and Kick count  Continue with routine prenatal care.  RTC in 1 wk for prenatal visit    MEDICATIONS:  No orders of the defined types were placed in this encounter.      ORDERS:  Orders Placed This Encounter   Procedures    Culture, Strep B Screen, Vaginal/Rectal    C.trachomatis, N.gonorrhoeae, T.vaginalis Molecular, Thin Prep

## 2024-04-06 LAB — GP B STREP DNA SPEC QL NAA+PROBE: NOT DETECTED

## 2024-04-10 ENCOUNTER — ROUTINE PRENATAL (OUTPATIENT)
Dept: OBGYN CLINIC | Age: 24
End: 2024-04-10

## 2024-04-10 VITALS
WEIGHT: 222 LBS | BODY MASS INDEX: 41.95 KG/M2 | DIASTOLIC BLOOD PRESSURE: 88 MMHG | HEART RATE: 88 BPM | SYSTOLIC BLOOD PRESSURE: 137 MMHG

## 2024-04-10 DIAGNOSIS — O99.210 OBESITY AFFECTING PREGNANCY, ANTEPARTUM, UNSPECIFIED OBESITY TYPE: ICD-10-CM

## 2024-04-10 DIAGNOSIS — O99.340 ANXIETY DISORDER AFFECTING PREGNANCY, ANTEPARTUM: ICD-10-CM

## 2024-04-10 DIAGNOSIS — M79.89 BILATERAL SWELLING OF FEET: ICD-10-CM

## 2024-04-10 DIAGNOSIS — Z82.79 FAMILY HISTORY OF CONGENITAL HEART DEFECT: ICD-10-CM

## 2024-04-10 DIAGNOSIS — Z3A.36 36 WEEKS GESTATION OF PREGNANCY: Primary | ICD-10-CM

## 2024-04-10 DIAGNOSIS — F41.9 ANXIETY DISORDER AFFECTING PREGNANCY, ANTEPARTUM: ICD-10-CM

## 2024-04-10 DIAGNOSIS — Z34.03 ENCOUNTER FOR SUPERVISION OF NORMAL FIRST PREGNANCY IN THIRD TRIMESTER: ICD-10-CM

## 2024-04-10 PROCEDURE — 0502F SUBSEQUENT PRENATAL CARE: CPT | Performed by: NURSE PRACTITIONER

## 2024-04-10 NOTE — PROGRESS NOTES
[facility] Prenatal Office Note  Subjective:  Valentine Hicks is here for a return obstetrical visit. Today she is 36w3d weeks EGA. She is doing well, taking her PNV as directed, and has no complaints.  She  does not have vaginal bleeding, n/v, syncope, or headaches.  Pt does feel fetal movement regularly.    Objective:  Mother's Prenatal Vitals  BP: 137/88  Weight - Scale: 100.7 kg (222 lb)  Pulse: 88  Patient Position: Sitting  Prenatal Fetal Information  Fundal Height (cm): 37 cm  Fetal HR: 132  Movement: Present  Pt is A&Ox3, in no acute distress. Normocephalic, atraumatic. PERRL. Resp even and non-labored. Skin pink, warm & dry. Gravid abdomen. LANDIS's well. Gait steady.   Assessment:    IUP at 36w3d wks      Diagnosis Orders   1. 36 weeks gestation of pregnancy        2. Encounter for supervision of normal first pregnancy in third trimester        3. Family history of congenital heart defect        4. Obesity affecting pregnancy, antepartum, unspecified obesity type        5. Bilateral swelling of feet        6. Anxiety disorder affecting pregnancy, antepartum          Plan:  Pt counseled on balanced nutrition, adequate fluid intake, taking PNV daily, and exercise along with labor precautions, GHTN precautions, and Kick count  Continue with routine prenatal care.   surveillance not indicated  Return in about 1 week (around 2024) for prenatal.     MEDICATIONS:  No orders of the defined types were placed in this encounter.    ORDERS:  No orders of the defined types were placed in this encounter.      More than 50% of this 20 min visit was education and counseling.        FILOMENA Jordan, am scribing for and in the presence of SOLOMON Dowell.  4/10/24  4:22 PM CDT     I have seen and examined the patient independently.  I reviewed all laboratory and imaging studies that are relevant.  I have reviewed and made any appropriate changes to the HPI.    Electronically signed by Tiffany Ramos

## 2024-04-10 NOTE — PROGRESS NOTES
Pt is here for routine parental care. Pt denies vaginal bleeding, cramping or leaking of fluid. Pt states + fetal movement.  She is c/o about her emotions she is crying more often.  And swelling in her feet have worsen.

## 2024-04-19 ENCOUNTER — ROUTINE PRENATAL (OUTPATIENT)
Dept: OBGYN CLINIC | Age: 24
End: 2024-04-19

## 2024-04-19 VITALS
BODY MASS INDEX: 42.32 KG/M2 | HEART RATE: 82 BPM | WEIGHT: 224 LBS | SYSTOLIC BLOOD PRESSURE: 99 MMHG | DIASTOLIC BLOOD PRESSURE: 61 MMHG

## 2024-04-19 DIAGNOSIS — M79.89 BILATERAL SWELLING OF FEET: ICD-10-CM

## 2024-04-19 DIAGNOSIS — Z34.03 ENCOUNTER FOR SUPERVISION OF NORMAL FIRST PREGNANCY IN THIRD TRIMESTER: ICD-10-CM

## 2024-04-19 DIAGNOSIS — Z3A.37 37 WEEKS GESTATION OF PREGNANCY: Primary | ICD-10-CM

## 2024-04-19 DIAGNOSIS — Z82.79 FAMILY HISTORY OF CONGENITAL HEART DEFECT: ICD-10-CM

## 2024-04-19 DIAGNOSIS — F41.9 ANXIETY DISORDER AFFECTING PREGNANCY, ANTEPARTUM: ICD-10-CM

## 2024-04-19 DIAGNOSIS — O99.340 ANXIETY DISORDER AFFECTING PREGNANCY, ANTEPARTUM: ICD-10-CM

## 2024-04-19 DIAGNOSIS — O99.210 OBESITY AFFECTING PREGNANCY, ANTEPARTUM, UNSPECIFIED OBESITY TYPE: ICD-10-CM

## 2024-04-19 PROCEDURE — 0502F SUBSEQUENT PRENATAL CARE: CPT | Performed by: NURSE PRACTITIONER

## 2024-04-19 NOTE — PATIENT INSTRUCTIONS
Patient Education        Week 37 of Your Pregnancy: Care Instructions    Most babies are born between 37 and 40 weeks.    This is a good time to pack a bag to take with you to the birth. Then it will be ready to go when you are.    Learn about breastfeeding.  For example, find out about ways to hold your baby to make breastfeeding easier. And think about learning how to pump and store milk.     Know that crying is normal.  It's common for babies to cry 1 to 3 hours a day. Some cry more, and some cry less.     Learn why babies cry.  They may be hungry; have gas; need a diaper change; or feel cold, warm, tired, lonely, or tense. Sometimes they cry for unknown reasons.     Think about what will help you stay calm when your baby cries.  Taking slow, deep breaths can help. So can taking a break. It's okay to put your baby somewhere safe (like their crib) and walk away for a few minutes.     Learn about safe sleep for your baby.  Always put your baby to sleep on their back. Place them alone in a crib or bassinet with a firm, flat surface. Keep soft items like stuffed animals out of the crib.     Learn what to expect with  poop.  Your baby will have their own bowel patterns. Some babies have several bowel movements a day. Some have fewer.     Know that  babies will often have loose, yellow bowel movements.  Formula-fed babies have more formed stools. If your baby's poop looks like pellets, your baby is constipated.   Follow-up care is a key part of your treatment and safety. Be sure to make and go to all appointments, and call your doctor if you are having problems. It's also a good idea to know your test results and keep a list of the medicines you take.  Where can you learn more?  Go to https://www.PowerMag.net/patientEd and enter N257 to learn more about \"Week 37 of Your Pregnancy: Care Instructions.\"  Current as of: July 10, 2023               Content Version: 14.0  © 8426-1583 Healthwise,

## 2024-04-19 NOTE — PROGRESS NOTES
[facility] Prenatal Office Note  Subjective:  Valentine Hicks is here for a return obstetrical visit. Today she is 37w5d weeks EGA. She is doing well, taking her PNV as directed, and has no complaints.  She  does not have vaginal bleeding, n/v, syncope, or headaches.  Pt does feel fetal movement regularly.    Objective:  Mother's Prenatal Vitals  BP: 99/61  Weight - Scale: 101.6 kg (224 lb)  Pulse: 82  Patient Position: Sitting  Prenatal Fetal Information  Movement: Present  Pt is A&Ox3, in no acute distress. Normocephalic, atraumatic. PERRL. Resp even and non-labored. Skin pink, warm & dry. Gravid abdomen. LANDIS's well. Gait steady.   Assessment:    IUP at 37w5d wks      Diagnosis Orders   1. 37 weeks gestation of pregnancy        2. Encounter for supervision of normal first pregnancy in third trimester        3. Family history of congenital heart defect        4. Obesity affecting pregnancy, antepartum, unspecified obesity type        5. Anxiety disorder affecting pregnancy, antepartum        6. Bilateral swelling of feet          Plan:  Pt counseled on balanced nutrition, adequate fluid intake, taking PNV daily, and exercise along with labor precautions, GHTN precautions, and Kick count  Continue with routine prenatal care.   surveillance not indicated  Return in about 1 week (around 2024) for prenatal.     MEDICATIONS:  No orders of the defined types were placed in this encounter.    ORDERS:  No orders of the defined types were placed in this encounter.      More than 50% of this 20 min visit was education and counseling.        I FILOMENA Meek, am scribing for and in the presence of SOLOMON Dowell.  24  9:45 AM CDT     I have seen and examined the patient independently.  I reviewed all laboratory and imaging studies that are relevant.  I have reviewed and made any appropriate changes to the HPI.    Electronically signed by LEYDA Alva CNM on 24  at 10:10 PM CDT

## 2024-04-23 ENCOUNTER — HOSPITAL ENCOUNTER (INPATIENT)
Age: 24
LOS: 3 days | Discharge: HOME | End: 2024-04-26
Attending: ADVANCED PRACTICE MIDWIFE | Admitting: NURSE PRACTITIONER
Payer: COMMERCIAL

## 2024-04-23 ENCOUNTER — ANESTHESIA EVENT (OUTPATIENT)
Dept: LABOR AND DELIVERY | Age: 24
End: 2024-04-23
Payer: COMMERCIAL

## 2024-04-23 ENCOUNTER — ANESTHESIA (OUTPATIENT)
Dept: LABOR AND DELIVERY | Age: 24
End: 2024-04-23
Payer: COMMERCIAL

## 2024-04-23 PROBLEM — Z3A.38 38 WEEKS GESTATION OF PREGNANCY: Status: ACTIVE | Noted: 2024-04-23

## 2024-04-23 LAB
ABO/RH: NORMAL
AMNISURE, POC: POSITIVE
AMPHET UR QL SCN: NEGATIVE
ANTIBODY SCREEN: NORMAL
BARBITURATES UR QL SCN: NEGATIVE
BASE EXCESS ARTERIAL: -1.1 MMOL/L (ref -2–2)
BENZODIAZ UR QL SCN: NEGATIVE
BLD GP AB SCN SERPL QL: NORMAL
BUPRENORPHINE URINE: NEGATIVE
C TRACH DNA CVX QL NAA+PROBE: NOT DETECTED
CANNABINOIDS UR QL SCN: NEGATIVE
CARBOXYHEMOGLOBIN ARTERIAL: 0.6 % (ref 0–5)
COCAINE UR QL SCN: NEGATIVE
DRUG SCREEN COMMENT UR-IMP: NORMAL
ERYTHROCYTE [DISTWIDTH] IN BLOOD BY AUTOMATED COUNT: 13.4 % (ref 11.5–14.5)
FENTANYL SCREEN, URINE: NEGATIVE
HCO3 ARTERIAL: 26.8 MMOL/L (ref 22–26)
HCT VFR BLD AUTO: 38.3 % (ref 37–47)
HEMOGLOBIN, ART, EXTENDED: 14.6 G/DL (ref 12–16)
HGB BLD-MCNC: 12.7 G/DL (ref 12–16)
Lab: NORMAL
MCH RBC QN AUTO: 29 PG (ref 27–31)
MCHC RBC AUTO-ENTMCNC: 33.2 G/DL (ref 33–37)
MCV RBC AUTO: 87.4 FL (ref 81–99)
METHADONE UR QL SCN: NEGATIVE
METHAMPHETAMINE, URINE: NEGATIVE
METHEMOGLOBIN ARTERIAL: 1.6 %
MODE: ABNORMAL
N GONORRHOEA DNA CERV MUCUS QL NAA+PROBE: NOT DETECTED
NEGATIVE QC PASS/FAIL: NORMAL
O2 CONTENT ARTERIAL: 2 ML/DL
O2 SAT, ARTERIAL: 9.6 %
O2 THERAPY: ABNORMAL
OPIATES UR QL SCN: NEGATIVE
OXYCODONE UR QL SCN: NEGATIVE
PCO2 ARTERIAL: 57 MMHG (ref 35–45)
PCP UR QL SCN: NEGATIVE
PH ARTERIAL: 7.28 (ref 7.35–7.45)
PLATELET # BLD AUTO: 215 K/UL (ref 130–400)
PMV BLD AUTO: 10.9 FL (ref 9.4–12.3)
PO2 ARTERIAL: 9 MMHG (ref 80–100)
POSITIVE QC PASS/FAIL: NORMAL
POTASSIUM BLD-SCNC: 4 MMOL/L
RBC # BLD AUTO: 4.38 M/UL (ref 4.2–5.4)
T VAGINALIS DNA GENITAL QL NAA+PROBE: NOT DETECTED
TRICYCLIC, URINE: NEGATIVE
WBC # BLD AUTO: 7.4 K/UL (ref 4.8–10.8)

## 2024-04-23 PROCEDURE — 87491 CHLMYD TRACH DNA AMP PROBE: CPT

## 2024-04-23 PROCEDURE — 59514 CESAREAN DELIVERY ONLY: CPT | Performed by: NURSE PRACTITIONER

## 2024-04-23 PROCEDURE — 6360000002 HC RX W HCPCS: Performed by: NURSE ANESTHETIST, CERTIFIED REGISTERED

## 2024-04-23 PROCEDURE — L0450 TLSO FLEX TRUNK/THOR PRE OTS: HCPCS | Performed by: OBSTETRICS & GYNECOLOGY

## 2024-04-23 PROCEDURE — 82803 BLOOD GASES ANY COMBINATION: CPT

## 2024-04-23 PROCEDURE — 86593 SYPHILIS TEST NON-TREP QUANT: CPT

## 2024-04-23 PROCEDURE — 59050 FETAL MONITOR W/REPORT: CPT

## 2024-04-23 PROCEDURE — 86592 SYPHILIS TEST NON-TREP QUAL: CPT

## 2024-04-23 PROCEDURE — G0480 DRUG TEST DEF 1-7 CLASSES: HCPCS

## 2024-04-23 PROCEDURE — 6360000002 HC RX W HCPCS: Performed by: NURSE PRACTITIONER

## 2024-04-23 PROCEDURE — 2500000003 HC RX 250 WO HCPCS: Performed by: NURSE ANESTHETIST, CERTIFIED REGISTERED

## 2024-04-23 PROCEDURE — 36415 COLL VENOUS BLD VENIPUNCTURE: CPT

## 2024-04-23 PROCEDURE — 2709999900 HC NON-CHARGEABLE SUPPLY: Performed by: OBSTETRICS & GYNECOLOGY

## 2024-04-23 PROCEDURE — 86850 RBC ANTIBODY SCREEN: CPT

## 2024-04-23 PROCEDURE — 80307 DRUG TEST PRSMV CHEM ANLYZR: CPT

## 2024-04-23 PROCEDURE — 87591 N.GONORRHOEAE DNA AMP PROB: CPT

## 2024-04-23 PROCEDURE — 2580000003 HC RX 258: Performed by: NURSE PRACTITIONER

## 2024-04-23 PROCEDURE — 7100000000 HC PACU RECOVERY - FIRST 15 MIN: Performed by: OBSTETRICS & GYNECOLOGY

## 2024-04-23 PROCEDURE — 36600 WITHDRAWAL OF ARTERIAL BLOOD: CPT

## 2024-04-23 PROCEDURE — 86901 BLOOD TYPING SEROLOGIC RH(D): CPT

## 2024-04-23 PROCEDURE — 1220000000 HC SEMI PRIVATE OB R&B

## 2024-04-23 PROCEDURE — 3700000025 EPIDURAL BLOCK: Performed by: NURSE ANESTHETIST, CERTIFIED REGISTERED

## 2024-04-23 PROCEDURE — 3609079900 HC CESAREAN SECTION: Performed by: OBSTETRICS & GYNECOLOGY

## 2024-04-23 PROCEDURE — 7100000001 HC PACU RECOVERY - ADDTL 15 MIN: Performed by: OBSTETRICS & GYNECOLOGY

## 2024-04-23 PROCEDURE — 86900 BLOOD TYPING SEROLOGIC ABO: CPT

## 2024-04-23 PROCEDURE — 86780 TREPONEMA PALLIDUM: CPT

## 2024-04-23 PROCEDURE — 85027 COMPLETE CBC AUTOMATED: CPT

## 2024-04-23 PROCEDURE — 87661 TRICHOMONAS VAGINALIS AMPLIF: CPT

## 2024-04-23 RX ORDER — DOCUSATE SODIUM 100 MG/1
100 CAPSULE, LIQUID FILLED ORAL 2 TIMES DAILY
Status: DISCONTINUED | OUTPATIENT
Start: 2024-04-23 | End: 2024-04-26 | Stop reason: HOSPADM

## 2024-04-23 RX ORDER — ROPIVACAINE HYDROCHLORIDE 2 MG/ML
INJECTION, SOLUTION EPIDURAL; INFILTRATION; PERINEURAL PRN
Status: DISCONTINUED | OUTPATIENT
Start: 2024-04-23 | End: 2024-04-23 | Stop reason: SDUPTHER

## 2024-04-23 RX ORDER — SODIUM CHLORIDE, SODIUM LACTATE, POTASSIUM CHLORIDE, AND CALCIUM CHLORIDE .6; .31; .03; .02 G/100ML; G/100ML; G/100ML; G/100ML
500 INJECTION, SOLUTION INTRAVENOUS PRN
Status: DISCONTINUED | OUTPATIENT
Start: 2024-04-23 | End: 2024-04-25

## 2024-04-23 RX ORDER — ONDANSETRON 4 MG/1
4 TABLET, ORALLY DISINTEGRATING ORAL EVERY 8 HOURS PRN
Status: DISCONTINUED | OUTPATIENT
Start: 2024-04-23 | End: 2024-04-26 | Stop reason: HOSPADM

## 2024-04-23 RX ORDER — METHYLERGONOVINE MALEATE 0.2 MG/ML
200 INJECTION INTRAVENOUS PRN
Status: DISCONTINUED | OUTPATIENT
Start: 2024-04-23 | End: 2024-04-23 | Stop reason: SDUPTHER

## 2024-04-23 RX ORDER — CEFAZOLIN SODIUM 1 G/3ML
INJECTION, POWDER, FOR SOLUTION INTRAMUSCULAR; INTRAVENOUS PRN
Status: DISCONTINUED | OUTPATIENT
Start: 2024-04-23 | End: 2024-04-23 | Stop reason: SDUPTHER

## 2024-04-23 RX ORDER — TERBUTALINE SULFATE 1 MG/ML
0.25 INJECTION, SOLUTION SUBCUTANEOUS
Status: ACTIVE | OUTPATIENT
Start: 2024-04-23 | End: 2024-04-24

## 2024-04-23 RX ORDER — CARBOPROST TROMETHAMINE 250 UG/ML
250 INJECTION, SOLUTION INTRAMUSCULAR PRN
Status: DISCONTINUED | OUTPATIENT
Start: 2024-04-23 | End: 2024-04-26 | Stop reason: HOSPADM

## 2024-04-23 RX ORDER — ONDANSETRON 2 MG/ML
4 INJECTION INTRAMUSCULAR; INTRAVENOUS EVERY 6 HOURS PRN
Status: DISCONTINUED | OUTPATIENT
Start: 2024-04-23 | End: 2024-04-25

## 2024-04-23 RX ORDER — LIDOCAINE HYDROCHLORIDE AND EPINEPHRINE 15; 5 MG/ML; UG/ML
INJECTION, SOLUTION EPIDURAL PRN
Status: DISCONTINUED | OUTPATIENT
Start: 2024-04-23 | End: 2024-04-23 | Stop reason: SDUPTHER

## 2024-04-23 RX ORDER — DEXMEDETOMIDINE HYDROCHLORIDE 100 UG/ML
INJECTION, SOLUTION INTRAVENOUS PRN
Status: DISCONTINUED | OUTPATIENT
Start: 2024-04-23 | End: 2024-04-23 | Stop reason: SDUPTHER

## 2024-04-23 RX ORDER — LIDOCAINE HCL/EPINEPHRINE/PF 2%-1:200K
VIAL (ML) INJECTION PRN
Status: DISCONTINUED | OUTPATIENT
Start: 2024-04-23 | End: 2024-04-23 | Stop reason: SDUPTHER

## 2024-04-23 RX ORDER — DEXAMETHASONE SODIUM PHOSPHATE 10 MG/ML
INJECTION, SOLUTION INTRAMUSCULAR; INTRAVENOUS PRN
Status: DISCONTINUED | OUTPATIENT
Start: 2024-04-23 | End: 2024-04-23 | Stop reason: SDUPTHER

## 2024-04-23 RX ORDER — ONDANSETRON 4 MG/1
4 TABLET, ORALLY DISINTEGRATING ORAL EVERY 6 HOURS PRN
Status: DISCONTINUED | OUTPATIENT
Start: 2024-04-23 | End: 2024-04-23 | Stop reason: SDUPTHER

## 2024-04-23 RX ORDER — SODIUM CHLORIDE 0.9 % (FLUSH) 0.9 %
5-40 SYRINGE (ML) INJECTION PRN
Status: DISCONTINUED | OUTPATIENT
Start: 2024-04-23 | End: 2024-04-25

## 2024-04-23 RX ORDER — SODIUM CHLORIDE, SODIUM LACTATE, POTASSIUM CHLORIDE, CALCIUM CHLORIDE 600; 310; 30; 20 MG/100ML; MG/100ML; MG/100ML; MG/100ML
INJECTION, SOLUTION INTRAVENOUS CONTINUOUS
Status: DISCONTINUED | OUTPATIENT
Start: 2024-04-23 | End: 2024-04-25

## 2024-04-23 RX ORDER — OXYCODONE HYDROCHLORIDE 10 MG/1
10 TABLET ORAL EVERY 4 HOURS PRN
Status: DISCONTINUED | OUTPATIENT
Start: 2024-04-23 | End: 2024-04-26 | Stop reason: HOSPADM

## 2024-04-23 RX ORDER — METHYLERGONOVINE MALEATE 0.2 MG/ML
200 INJECTION INTRAVENOUS PRN
Status: DISCONTINUED | OUTPATIENT
Start: 2024-04-23 | End: 2024-04-26 | Stop reason: HOSPADM

## 2024-04-23 RX ORDER — SODIUM CHLORIDE 0.9 % (FLUSH) 0.9 %
5-40 SYRINGE (ML) INJECTION EVERY 12 HOURS SCHEDULED
Status: DISCONTINUED | OUTPATIENT
Start: 2024-04-23 | End: 2024-04-23 | Stop reason: SDUPTHER

## 2024-04-23 RX ORDER — IBUPROFEN 400 MG/1
800 TABLET ORAL EVERY 8 HOURS
Status: DISCONTINUED | OUTPATIENT
Start: 2024-04-24 | End: 2024-04-25

## 2024-04-23 RX ORDER — FENTANYL CITRATE 50 UG/ML
INJECTION, SOLUTION INTRAMUSCULAR; INTRAVENOUS PRN
Status: DISCONTINUED | OUTPATIENT
Start: 2024-04-23 | End: 2024-04-23 | Stop reason: SDUPTHER

## 2024-04-23 RX ORDER — SODIUM CHLORIDE, SODIUM LACTATE, POTASSIUM CHLORIDE, AND CALCIUM CHLORIDE .6; .31; .03; .02 G/100ML; G/100ML; G/100ML; G/100ML
1000 INJECTION, SOLUTION INTRAVENOUS PRN
Status: DISCONTINUED | OUTPATIENT
Start: 2024-04-23 | End: 2024-04-25

## 2024-04-23 RX ORDER — NALOXONE HYDROCHLORIDE 0.4 MG/ML
INJECTION, SOLUTION INTRAMUSCULAR; INTRAVENOUS; SUBCUTANEOUS PRN
Status: DISCONTINUED | OUTPATIENT
Start: 2024-04-23 | End: 2024-04-23 | Stop reason: SDUPTHER

## 2024-04-23 RX ORDER — ONDANSETRON 2 MG/ML
4 INJECTION INTRAMUSCULAR; INTRAVENOUS EVERY 6 HOURS PRN
Status: DISCONTINUED | OUTPATIENT
Start: 2024-04-23 | End: 2024-04-23 | Stop reason: SDUPTHER

## 2024-04-23 RX ORDER — KETOROLAC TROMETHAMINE 30 MG/ML
INJECTION, SOLUTION INTRAMUSCULAR; INTRAVENOUS PRN
Status: DISCONTINUED | OUTPATIENT
Start: 2024-04-23 | End: 2024-04-23 | Stop reason: SDUPTHER

## 2024-04-23 RX ORDER — SODIUM CHLORIDE 9 MG/ML
INJECTION, SOLUTION INTRAVENOUS PRN
Status: DISCONTINUED | OUTPATIENT
Start: 2024-04-23 | End: 2024-04-25

## 2024-04-23 RX ORDER — PRENATAL VIT/IRON FUM/FOLIC AC 27MG-0.8MG
1 TABLET ORAL DAILY
Status: DISCONTINUED | OUTPATIENT
Start: 2024-04-24 | End: 2024-04-26 | Stop reason: HOSPADM

## 2024-04-23 RX ORDER — EPHEDRINE SULFATE/0.9% NACL/PF 25 MG/5 ML
10 SYRINGE (ML) INTRAVENOUS AS NEEDED
Status: DISCONTINUED | OUTPATIENT
Start: 2024-04-23 | End: 2024-04-24 | Stop reason: HOSPADM

## 2024-04-23 RX ORDER — ACETAMINOPHEN 500 MG
1000 TABLET ORAL EVERY 8 HOURS SCHEDULED
Status: DISCONTINUED | OUTPATIENT
Start: 2024-04-24 | End: 2024-04-26 | Stop reason: HOSPADM

## 2024-04-23 RX ORDER — SODIUM CHLORIDE 0.9 % (FLUSH) 0.9 %
5-40 SYRINGE (ML) INJECTION EVERY 12 HOURS SCHEDULED
Status: DISCONTINUED | OUTPATIENT
Start: 2024-04-23 | End: 2024-04-25

## 2024-04-23 RX ORDER — OXYCODONE HYDROCHLORIDE 5 MG/1
5 TABLET ORAL EVERY 4 HOURS PRN
Status: DISCONTINUED | OUTPATIENT
Start: 2024-04-23 | End: 2024-04-26 | Stop reason: HOSPADM

## 2024-04-23 RX ORDER — MISOPROSTOL 200 UG/1
400 TABLET ORAL PRN
Status: DISCONTINUED | OUTPATIENT
Start: 2024-04-23 | End: 2024-04-26 | Stop reason: HOSPADM

## 2024-04-23 RX ORDER — ONDANSETRON 2 MG/ML
INJECTION INTRAMUSCULAR; INTRAVENOUS PRN
Status: DISCONTINUED | OUTPATIENT
Start: 2024-04-23 | End: 2024-04-23 | Stop reason: SDUPTHER

## 2024-04-23 RX ORDER — DEXTROSE, SODIUM CHLORIDE, SODIUM LACTATE, POTASSIUM CHLORIDE, AND CALCIUM CHLORIDE 5; .6; .31; .03; .02 G/100ML; G/100ML; G/100ML; G/100ML; G/100ML
INJECTION, SOLUTION INTRAVENOUS CONTINUOUS
Status: DISCONTINUED | OUTPATIENT
Start: 2024-04-23 | End: 2024-04-25

## 2024-04-23 RX ORDER — BISACODYL 10 MG
10 SUPPOSITORY, RECTAL RECTAL DAILY PRN
Status: DISCONTINUED | OUTPATIENT
Start: 2024-04-23 | End: 2024-04-26 | Stop reason: HOSPADM

## 2024-04-23 RX ORDER — SODIUM CHLORIDE 9 MG/ML
25 INJECTION, SOLUTION INTRAVENOUS PRN
Status: DISCONTINUED | OUTPATIENT
Start: 2024-04-23 | End: 2024-04-23 | Stop reason: SDUPTHER

## 2024-04-23 RX ORDER — KETOROLAC TROMETHAMINE 30 MG/ML
30 INJECTION, SOLUTION INTRAMUSCULAR; INTRAVENOUS EVERY 6 HOURS
Status: DISCONTINUED | OUTPATIENT
Start: 2024-04-24 | End: 2024-04-25

## 2024-04-23 RX ADMIN — ROPIVACAINE HYDROCHLORIDE 8 ML/HR: 2 INJECTION, SOLUTION EPIDURAL; INFILTRATION at 11:45

## 2024-04-23 RX ADMIN — SODIUM CHLORIDE, SODIUM LACTATE, POTASSIUM CHLORIDE, AND CALCIUM CHLORIDE: 600; 310; 30; 20 INJECTION, SOLUTION INTRAVENOUS at 21:10

## 2024-04-23 RX ADMIN — ONDANSETRON 4 MG: 2 INJECTION INTRAMUSCULAR; INTRAVENOUS at 21:42

## 2024-04-23 RX ADMIN — Medication 250 ML/HR: at 21:53

## 2024-04-23 RX ADMIN — Medication 2 MILLI-UNITS/MIN: at 17:39

## 2024-04-23 RX ADMIN — DEXAMETHASONE SODIUM PHOSPHATE 5 MG: 10 INJECTION, SOLUTION INTRAMUSCULAR; INTRAVENOUS at 21:42

## 2024-04-23 RX ADMIN — LIDOCAINE HYDROCHLORIDE,EPINEPHRINE BITARTRATE 5 ML: 20; .005 INJECTION, SOLUTION EPIDURAL; INFILTRATION; INTRACAUDAL; PERINEURAL at 21:22

## 2024-04-23 RX ADMIN — LIDOCAINE HYDROCHLORIDE,EPINEPHRINE BITARTRATE 10 ML: 20; .005 INJECTION, SOLUTION EPIDURAL; INFILTRATION; INTRACAUDAL; PERINEURAL at 21:07

## 2024-04-23 RX ADMIN — BUTORPHANOL TARTRATE 1 MG: 2 INJECTION, SOLUTION INTRAMUSCULAR; INTRAVENOUS at 09:49

## 2024-04-23 RX ADMIN — DEXMEDETOMIDINE HYDROCHLORIDE 5 MCG: 100 INJECTION, SOLUTION, CONCENTRATE INTRAVENOUS at 11:35

## 2024-04-23 RX ADMIN — SODIUM CHLORIDE, POTASSIUM CHLORIDE, SODIUM LACTATE AND CALCIUM CHLORIDE 500 ML: 600; 310; 30; 20 INJECTION, SOLUTION INTRAVENOUS at 11:00

## 2024-04-23 RX ADMIN — CEFAZOLIN 2 G: 1 INJECTION, POWDER, FOR SOLUTION INTRAMUSCULAR; INTRAVENOUS at 21:22

## 2024-04-23 RX ADMIN — EPHEDRINE SULFATE 10 MG: 5 INJECTION INTRAVENOUS at 12:20

## 2024-04-23 RX ADMIN — SODIUM CHLORIDE, SODIUM LACTATE, POTASSIUM CHLORIDE, AND CALCIUM CHLORIDE: 600; 310; 30; 20 INJECTION, SOLUTION INTRAVENOUS at 08:12

## 2024-04-23 RX ADMIN — DEXMEDETOMIDINE HYDROCHLORIDE 20 MCG: 100 INJECTION, SOLUTION INTRAVENOUS at 21:07

## 2024-04-23 RX ADMIN — LIDOCAINE HYDROCHLORIDE,EPINEPHRINE BITARTRATE 5 ML: 20; .005 INJECTION, SOLUTION EPIDURAL; INFILTRATION; INTRACAUDAL; PERINEURAL at 21:37

## 2024-04-23 RX ADMIN — Medication 2 MILLI-UNITS/MIN: at 08:30

## 2024-04-23 RX ADMIN — DEXMEDETOMIDINE HYDROCHLORIDE 20 MCG: 100 INJECTION, SOLUTION INTRAVENOUS at 11:44

## 2024-04-23 RX ADMIN — ONDANSETRON 4 MG: 2 INJECTION INTRAMUSCULAR; INTRAVENOUS at 18:56

## 2024-04-23 RX ADMIN — FENTANYL CITRATE 100 MCG: 50 INJECTION, SOLUTION INTRAMUSCULAR; INTRAVENOUS at 21:37

## 2024-04-23 RX ADMIN — ROPIVACAINE HYDROCHLORIDE 5 ML: 2 INJECTION, SOLUTION EPIDURAL; INFILTRATION at 11:41

## 2024-04-23 RX ADMIN — KETOROLAC TROMETHAMINE 30 MG: 30 INJECTION, SOLUTION INTRAMUSCULAR; INTRAVENOUS at 21:56

## 2024-04-23 RX ADMIN — Medication 1000 ML/HR: at 21:30

## 2024-04-23 RX ADMIN — LIDOCAINE HYDROCHLORIDE AND EPINEPHRINE 3 ML: 15; 5 INJECTION, SOLUTION EPIDURAL at 11:37

## 2024-04-23 RX ADMIN — EPHEDRINE SULFATE 10 MG: 5 INJECTION INTRAVENOUS at 12:10

## 2024-04-23 RX ADMIN — SODIUM CHLORIDE, SODIUM LACTATE, POTASSIUM CHLORIDE, AND CALCIUM CHLORIDE: 600; 310; 30; 20 INJECTION, SOLUTION INTRAVENOUS at 13:47

## 2024-04-23 RX ADMIN — SODIUM CHLORIDE, SODIUM LACTATE, POTASSIUM CHLORIDE, AND CALCIUM CHLORIDE: 600; 310; 30; 20 INJECTION, SOLUTION INTRAVENOUS at 20:22

## 2024-04-23 SDOH — ECONOMIC STABILITY: INCOME INSECURITY: IN THE PAST 12 MONTHS, HAS THE ELECTRIC, GAS, OIL, OR WATER COMPANY THREATENED TO SHUT OFF SERVICE IN YOUR HOME?: NO

## 2024-04-23 SDOH — ECONOMIC STABILITY: INCOME INSECURITY: HOW HARD IS IT FOR YOU TO PAY FOR THE VERY BASICS LIKE FOOD, HOUSING, MEDICAL CARE, AND HEATING?: NOT HARD AT ALL

## 2024-04-23 SDOH — ECONOMIC STABILITY: FOOD INSECURITY: WITHIN THE PAST 12 MONTHS, YOU WORRIED THAT YOUR FOOD WOULD RUN OUT BEFORE YOU GOT MONEY TO BUY MORE.: NEVER TRUE

## 2024-04-23 ASSESSMENT — PAIN - FUNCTIONAL ASSESSMENT: PAIN_FUNCTIONAL_ASSESSMENT: ACTIVITIES ARE NOT PREVENTED

## 2024-04-23 ASSESSMENT — PAIN DESCRIPTION - DESCRIPTORS: DESCRIPTORS: CRAMPING

## 2024-04-23 ASSESSMENT — PATIENT HEALTH QUESTIONNAIRE - PHQ9
SUM OF ALL RESPONSES TO PHQ QUESTIONS 1-9: 0
SUM OF ALL RESPONSES TO PHQ QUESTIONS 1-9: 0
2. FEELING DOWN, DEPRESSED OR HOPELESS: NOT AT ALL
1. LITTLE INTEREST OR PLEASURE IN DOING THINGS: NOT AT ALL
SUM OF ALL RESPONSES TO PHQ QUESTIONS 1-9: 0
SUM OF ALL RESPONSES TO PHQ9 QUESTIONS 1 & 2: 0
SUM OF ALL RESPONSES TO PHQ QUESTIONS 1-9: 0

## 2024-04-23 ASSESSMENT — PAIN DESCRIPTION - PAIN TYPE: TYPE: ACUTE PAIN

## 2024-04-23 ASSESSMENT — PAIN DESCRIPTION - FREQUENCY: FREQUENCY: INTERMITTENT

## 2024-04-23 ASSESSMENT — PAIN SCALES - GENERAL
PAINLEVEL_OUTOF10: 8
PAINLEVEL_OUTOF10: 2

## 2024-04-23 ASSESSMENT — PAIN DESCRIPTION - LOCATION: LOCATION: ABDOMEN

## 2024-04-23 NOTE — CARE COORDINATION
Evie Provided Pt with loneliness resources, stress resources, and parenting stress and infant resources in written documentation that will be provided to Pt at D/C. Electronically signed by Evie Condon on 4/23/2024 at 5:16 PM

## 2024-04-23 NOTE — DISCHARGE INSTRUCTIONS
https://www.cancercare.org/financial  304.033.7366    Mammogram and Ultrasound Assistance (Breast Cancer Assistance Program)  Provides assistance to uninsured and underinsured individuals for breast cancer screening  Website: https://www.abcf.org/our-programs/breast-cancer-assistance-program/  413.743.8369    Free Colon Cancer Screening Program  Provides assistance to uninsured and underinsured [place] residents based on financial eligibility.  Website: https://kycancerlink.org/colon-cancer/  378.087.4913    Employment Resources:  Employment & Training Office [phone]  7:30 AM - 5?PM Monday - Tuesday  7:30 AM - 4:30 PM Wednesday - Thursday  7:30 AM - 12 PM Friday  www.oet.[place].gov   [employer] ([phone]) 162-0821  8 AM - 5?PM Monday - Friday  [employer] (684) 027-8353  www.[employer] (975) 319-2282  8 AM - 5?PM Monday - Friday  www.people-lease.[employer]. ([phone] 567-5476  8 AM - 12?PM, 1 PM - 5 PM Monday - Friday  www.[employer]   [employer] ([phone]) [phone]  8 AM - 5?PM Monday - Thursday  8 AM - 4 PM Friday  www.per[employer]FindTheBeststaff.[employer]   [facility] ([phone]  [facility] [phone]  8 AM - 5?PM Monday - Friday  [employer] ([phone] [phone]  7 AM - 6?PM Monday - Friday  www.tradesmeninternational.com   [employer] ([phone] [phone]  8 AM - 5?PM Monday - Friday  www.[facility].[employer]   The [place] Office of Unemployment  Information about unemployment and assistance with filing a claim.   https://[facility].ky.gov/s/  416.500.4709    [place] [employer]  Get help with building a resume and searching for jobs.  [address] [address]  https://[facility].ky.gov/  905.002.5835    [facility]   Designed to help [place]’s most challenged job seekers overcome barriers that prevent them from finding success in the workforce.  [address] [place] [address]   ([phone]  140-1816  https://goodwillky.org/fdmlqtoeb-uhkxodvoxnr-glakmoi/     Other Assistance:  Low Income Home Energy Assistance Program (LIHEAP)  Federally-funded program to help eligible, low-income households meet their heating/cooling needs.   Website: https://www.chfs.ky.gov/agencies/dcbs/dfs/pdb/Pages/liheap.aspx  5.981.639.0378    [employer]  Serving single parent households, foster parents, and teens aging out of the foster care system. Provides clothing, home goods, and furniture to those who are most greatly in need of assistance.  [address], [address]nd floor [place] [address]  [phone]    [employer]  Assistance with household items and clothing needs.   [address] [place], [place] [address]- at [place] [employer]   Website: https://[employer]/acts  648.605.5115

## 2024-04-23 NOTE — ANESTHESIA PROCEDURE NOTES
Epidural Block    Patient location during procedure: OB  Reason for block: labor epidural  Staffing  Performed: resident/CRNA   Resident/CRNA: Fady Agustin APRN - CRNA  Performed by: Fady Agustin APRN - CRNA  Authorized by: Fady Agustin APRN - CRNA    Epidural  Patient position: sitting  Prep: Betadine  Patient monitoring: cardiac monitor, continuous pulse ox and frequent blood pressure checks  Approach: midline  Location: L3-4  Injection technique: JOHNATHON saline  Provider prep: mask and sterile gloves  Needle  Needle type: Tuohy   Needle gauge: 17 G  Needle length: 3.5 in  Needle insertion depth: 7 cm  Catheter type: multi-orifice  Catheter size: 19 G  Catheter at skin depth: 12 cm  Test dose: negativeCatheter Secured: tegaderm and tape  Assessment  Sensory level: T8  Events: cerebrospinal fluid  Hemodynamics: stable  Attempts: 1  Outcomes: uncomplicated and patient tolerated procedure well  Additional Notes  CSE 25g 5inch Pencan spinal needle     Preanesthetic Checklist  Completed: patient identified, IV checked, site marked, risks and benefits discussed, surgical/procedural consents, equipment checked, pre-op evaluation, timeout performed, anesthesia consent given, oxygen available, monitors applied/VS acknowledged, fire risk safety assessment completed and verbalized and blood product R/B/A discussed and consented

## 2024-04-23 NOTE — ANESTHESIA PRE PROCEDURE
Department of Anesthesiology  Preprocedure Note       Name:  Valentine Hicks   Age:  23 y.o.  :  2000                                          MRN:  [id number]         Date:  2024      Surgeon: * No surgeons listed *    Procedure: * No procedures listed *    Medications prior to admission:   Prior to Admission medications    Medication Sig Start Date End Date Taking? Authorizing Provider   ketoconazole (NIZORAL) 2 % cream Apply to affected area two times daily for 4 weeks  Patient not taking: Reported on 2024   Darling Dacosta APRN - NP   busPIRone (BUSPAR) 5 MG tablet TAKE ONE TABLET BY MOUTH 2 TIMES A DAY 11/15/23   Holli Garcia APRN - CNP   Prenatal Vit-Fe Fumarate-FA (PRENATAL VITAMINS) 28-0.8 MG TABS Take 1 tablet by mouth daily 23   Holli Garcia APRN - CNP       Current medications:    Current Facility-Administered Medications   Medication Dose Route Frequency Provider Last Rate Last Admin    lactated ringers IV soln infusion   IntraVENous Continuous Tiffany Ramos APRN -  mL/hr at 24 0812 New Bag at 24 0812    lactated ringers bolus 500 mL  500 mL IntraVENous PRN Tiffany Ramos APRN - CNM        Or    lactated ringers bolus 1,000 mL  1,000 mL IntraVENous PRN Tiffany Ramos APRN - CNM        sodium chloride flush 0.9 % injection 5-40 mL  5-40 mL IntraVENous 2 times per day Tiffany Ramos APRN - CNM        sodium chloride flush 0.9 % injection 5-40 mL  5-40 mL IntraVENous PRN Tiffany Ramos APRN - CNM        0.9 % sodium chloride infusion  25 mL IntraVENous PRN Tiffany Ramos APRN - CNM        methylergonovine (METHERGINE) injection 200 mcg  200 mcg IntraMUSCular PRN Tiffany Ramos APRN - CNM        carboprost (HEMABATE) injection 250 mcg  250 mcg IntraMUSCular PRN Tiffany Ramos APRN - CNM        miSOPROStol (CYTOTEC) tablet 400 mcg  400 mcg Buccal PRN Tiffany Ramos APRN - CNM        tranexamic acid (CYKLOKAPRON) 1,000 mg in

## 2024-04-23 NOTE — FLOWSHEET NOTE
Patient arrived ambulatory to labor and delivery with c/o SROM that occurred at approximately 0250 this morning. Pt states that it was clear in color, no odor, and a moderate amount was noted. Pt denies vaginal bleeding. Pt states + FM and feels irregular ctxs. EFM and toco applied to soft, nontender abdomen. Amnisure performed, positive results. MARCELO Ramos CNM to be notified of pt arrival, complaints, and positive amnisure results.

## 2024-04-24 LAB
ERYTHROCYTE [DISTWIDTH] IN BLOOD BY AUTOMATED COUNT: 13.2 % (ref 11.5–14.5)
HCT VFR BLD AUTO: 33.3 % (ref 37–47)
HGB BLD-MCNC: 11.6 G/DL (ref 12–16)
MCH RBC QN AUTO: 29.1 PG (ref 27–31)
MCHC RBC AUTO-ENTMCNC: 34.8 G/DL (ref 33–37)
MCV RBC AUTO: 83.7 FL (ref 81–99)
PLATELET # BLD AUTO: 195 K/UL (ref 130–400)
PMV BLD AUTO: 10.2 FL (ref 9.4–12.3)
RBC # BLD AUTO: 3.98 M/UL (ref 4.2–5.4)
WBC # BLD AUTO: 19 K/UL (ref 4.8–10.8)

## 2024-04-24 PROCEDURE — 1220000000 HC SEMI PRIVATE OB R&B

## 2024-04-24 PROCEDURE — 36415 COLL VENOUS BLD VENIPUNCTURE: CPT

## 2024-04-24 PROCEDURE — 6370000000 HC RX 637 (ALT 250 FOR IP): Performed by: OBSTETRICS & GYNECOLOGY

## 2024-04-24 PROCEDURE — 6360000002 HC RX W HCPCS: Performed by: OBSTETRICS & GYNECOLOGY

## 2024-04-24 PROCEDURE — 85027 COMPLETE CBC AUTOMATED: CPT

## 2024-04-24 RX ADMIN — KETOROLAC TROMETHAMINE 30 MG: 30 INJECTION INTRAMUSCULAR; INTRAVENOUS at 03:59

## 2024-04-24 RX ADMIN — OXYCODONE HYDROCHLORIDE 10 MG: 10 TABLET ORAL at 00:44

## 2024-04-24 RX ADMIN — DOCUSATE SODIUM 100 MG: 100 CAPSULE, LIQUID FILLED ORAL at 22:00

## 2024-04-24 RX ADMIN — ACETAMINOPHEN 1000 MG: 500 TABLET ORAL at 08:27

## 2024-04-24 RX ADMIN — ACETAMINOPHEN 1000 MG: 500 TABLET ORAL at 00:44

## 2024-04-24 RX ADMIN — PRENATAL VIT W/ FE FUMARATE-FA TAB 27-0.8 MG 1 TABLET: 27-0.8 TAB at 08:27

## 2024-04-24 RX ADMIN — DOCUSATE SODIUM 100 MG: 100 CAPSULE, LIQUID FILLED ORAL at 08:27

## 2024-04-24 RX ADMIN — ACETAMINOPHEN 1000 MG: 500 TABLET ORAL at 17:18

## 2024-04-24 RX ADMIN — IBUPROFEN 800 MG: 400 TABLET, FILM COATED ORAL at 23:23

## 2024-04-24 RX ADMIN — KETOROLAC TROMETHAMINE 30 MG: 30 INJECTION INTRAMUSCULAR; INTRAVENOUS at 12:17

## 2024-04-24 ASSESSMENT — PAIN DESCRIPTION - LOCATION
LOCATION: ABDOMEN
LOCATION: ABDOMEN
LOCATION: ABDOMEN;INCISION
LOCATION: ABDOMEN

## 2024-04-24 ASSESSMENT — PAIN DESCRIPTION - DESCRIPTORS
DESCRIPTORS: ACHING
DESCRIPTORS: CRAMPING;DISCOMFORT
DESCRIPTORS: DISCOMFORT
DESCRIPTORS: DISCOMFORT
DESCRIPTORS: ACHING
DESCRIPTORS: DISCOMFORT

## 2024-04-24 ASSESSMENT — PAIN - FUNCTIONAL ASSESSMENT
PAIN_FUNCTIONAL_ASSESSMENT: ACTIVITIES ARE NOT PREVENTED

## 2024-04-24 ASSESSMENT — PAIN SCALES - GENERAL
PAINLEVEL_OUTOF10: 2
PAINLEVEL_OUTOF10: 2
PAINLEVEL_OUTOF10: 4
PAINLEVEL_OUTOF10: 7
PAINLEVEL_OUTOF10: 4
PAINLEVEL_OUTOF10: 5

## 2024-04-24 ASSESSMENT — PAIN DESCRIPTION - ORIENTATION
ORIENTATION: LOWER
ORIENTATION: LOWER
ORIENTATION: PROXIMAL

## 2024-04-24 NOTE — LACTATION NOTE
Infant Name: Baby Boy  Gestation: 38.2  Day of Life: 1  Birth weight: (3160g)  Today's weight:  Weight loss:  24 hour summary of feeds: formula x 2   Voids: 3  Stools: 0  Assistive device: none  Maternal History: , ADHD, Anxiety,   Maternal Medications: Buspar, PNV  Maternal Goal: one day at a time  Breast pump for home:  yes    Mother states she has not tried to breast feed baby yet, but does want to. Instructed mother to breastfeed every 2- 3 hours for 15-20 mins each side or on demand watching for hunger cues and using waking techniques when needed. 8-12 feedings in 24 hours being the goal. Hand expression and breast compressions encouraged to increase milk supply and transfer. Discussed the benefits of colostrum, skin to skin and the importance of good positioning and latch. Informed mother that baby can be very sleepy the first 24 hours and typically the 2nd night babies will be more awake and want to feed a lot and that this is normal and important in establishing milk supply. Informed mother that there has to be lots of stimulation to the breast by pumping if  from baby to let her brain know to make lots of milk by raising prolactin hormone. And that is normal to get nothing to drops to 5 ml for 3-5 days before the transitional milk comes in. Discussed supply and demand. Encouraged to call out for help when needed.

## 2024-04-24 NOTE — PLAN OF CARE
Problem: Vaginal Birth or  Section  Goal: Fetal and maternal status remain reassuring during the birth process  Description:  Birth OB-Pregnancy care plan goal which identifies if the fetal and maternal status remain reassuring during the birth process  2024 by Miri Gómez RN  Outcome: Progressing  2024 by Meme Altman RN  Outcome: Progressing     Problem: Postpartum  Goal: Experiences normal postpartum course  Description:  Postpartum OB-Pregnancy care plan goal which identifies if the mother is experiencing a normal postpartum course  2024 by Miri Gómez RN  Outcome: Progressing  2024 by Meme Altman RN  Outcome: Progressing  Goal: Appropriate maternal -  bonding  Description:  Postpartum OB-Pregnancy care plan goal which identifies if the mother and  are bonding appropriately  2024 by Miri Gómez RN  Outcome: Progressing  2024 by Meme Altman RN  Outcome: Progressing  Goal: Establishment of infant feeding pattern  Description:  Postpartum OB-Pregnancy care plan goal which identifies if the mother is establishing a feeding pattern with their   2024 by Miri Gómez RN  Outcome: Progressing  2024 by Meme Altman RN  Outcome: Progressing  Goal: Incisions, wounds, or drain sites healing without S/S of infection  2024 by Miri Gómez RN  Outcome: Progressing  2024 by Meme Altman RN  Outcome: Progressing     Problem: Pain  Goal: Verbalizes/displays adequate comfort level or baseline comfort level  2024 by Miri Gómez RN  Outcome: Progressing  2024 by Meme Altman RN  Outcome: Progressing     Problem: Infection - Adult  Goal: Absence of infection at discharge  2024 by Miri Gómez RN  Outcome: Progressing  2024 by Meme Altman RN  Outcome: Progressing  Goal: Absence of infection during

## 2024-04-24 NOTE — CARE COORDINATION
Update:  added additional resources for Food, Financial, Mental Health, Housing and transportation this was written documentation that will be provided to the Pt at D/C. Electronically signed by Evie Condon on 4/24/2024 at 6:51 AM

## 2024-04-24 NOTE — FLOWSHEET NOTE
Patient up to restroom with assistance. Denies dizziness or lightheadedness. Unable to void at this time. Advised she needs to void by 1200. Abbey care performed.

## 2024-04-24 NOTE — H&P
LEEP:  No      Myomectomy:  No    Past Medical History:        Diagnosis Date    ADHD (attention deficit hyperactivity disorder)     Anxiety 1/14/2019    Obesity affecting pregnancy, antepartum 2/15/2024       Past Surgical History:    History reviewed. No pertinent surgical history.    Allergies:    Patient has no known allergies.    Social History:    Social History     Socioeconomic History    Marital status:      Spouse name: Not on file    Number of children: Not on file    Years of education: Not on file    Highest education level: Not on file   Occupational History    Not on file   Tobacco Use    Smoking status: Never    Smokeless tobacco: Never   Substance and Sexual Activity    Alcohol use: No    Drug use: No    Sexual activity: Not on file   Other Topics Concern    Not on file   Social History Narrative    Not on file     Social Determinants of Health     Financial Resource Strain: Low Risk  (4/23/2024)    Overall Financial Resource Strain (CARDIA)     Difficulty of Paying Living Expenses: Not hard at all   Food Insecurity: No Food Insecurity (4/23/2024)    Hunger Vital Sign     Worried About Running Out of Food in the Last Year: Never true     Ran Out of Food in the Last Year: Never true   Transportation Needs: No Transportation Needs (4/23/2024)    Transportation Problems ([facility]RS[facility])     In the past 12 months, has lack of reliable transportation kept you from medical appointments, meetings, work or from getting things needed for daily living?: No   Physical Activity: Sufficiently Active (4/23/2024)    Physical Activity ([facility]RSN)     In the last 30 days, other than the activities you did for work, on average, how many days per week did you engage in moderate exercise (like walking fast, running, jogging, dancing, swimming, biking, or other similar activites)?: 4     Number of minutes of exercise per week:: 240   Stress: Not on file   Social Connections: Socially Integrated (4/23/2024)    Social  Connections ([facility]RS[facility])     If for any reason you need help with day-to-day activities such as bathing, preparing meals, shopping, managing finances, etc., do you get the help you need?: I don't need any help   Intimate Partner Violence: Not on file   Housing Stability: Not on file (4/23/2024)       Family History:   History reviewed. No pertinent family history.    Medications Prior to Admission:  Medications Prior to Admission: ketoconazole (NIZORAL) 2 % cream, Apply to affected area two times daily for 4 weeks (Patient not taking: Reported on 4/23/2024)  busPIRone (BUSPAR) 5 MG tablet, TAKE ONE TABLET BY MOUTH 2 TIMES A DAY  Prenatal Vit-Fe Fumarate-FA (PRENATAL VITAMINS) 28-0.8 MG TABS, Take 1 tablet by mouth daily    PHYSICAL EXAM:     Vitals:    04/23/24 1922   BP: 111/61   Pulse:    Resp:    Temp:    SpO2:        General appearance:  awake, alert, cooperative, no apparent distress, and appears stated age  Skin:  Warm, dry, no rashes or erythema  Neurologic:  Awake, alert, oriented to name, place and time.  Cranial nerves II-XII are grossly intact.  Motor is 5 out of 5 bilaterally.  Lungs:  No increased work of breathing, good air exchange, clear to auscultation bilaterally, no crackles or wheezing  Heart:  Normal apical impulse, regular rate and rhythm, normal S1 and S2, no S3 or S4, and no murmur noted  Breast:  Deferred   Abdomen: Gravid, Non-Tender  Extremities:  no calf tenderness, non edematous, DTRs: normal    Pelvic Exam:  FETALPRESENTATION:Cephalic  DILATION:  4 cm  EFFACEMENT:   80  STATION:  -2 cm  CONSISTENCY:  soft  POSITION:  mid    John Score      0        1         2         3        Patient  Dilation                clsd     1-2      3-4      5-6      2  Effacement           0-30   40-50  60-70  >80     3  Station                  -3        -2       -1/0     +1/+2  1  Consistency         Firm    Med     Soft               2  Position                Post    Mid      Ant                1

## 2024-04-24 NOTE — L&D DELIVERY NOTE
Surgical First Assistant: In order to participate and provide continuity of care for the patient during a  section.  Providing optimal surgical exposure, participated in tissue dissection, ensured hemostasis, performed or facilitated wound closure, and performed other intraoperative functions to assist the surgeon in carrying out a safe operation that optimizes outcomes.     Aldair Hicks Valentine [[id number]]      Labor Events     Labor: No  Cervical Ripening Date/Time:        Rupture Date/Time:  24 02:50:00   Rupture Type: SROM  Fluid Color: Clear  Fluid Odor: None  Induction: None              Delivery Details      Delivery Date: 24 Delivery Time: 21:28:00                 Cord                  Placenta           Lacerations           Blood Loss  Mother: Kurt Valentine #[id number]     Start of Mother's Information      Delivery Blood Loss  24 2106 - 247      None                 End of Mother's Information  Mother: Kurt Valentine #[id number]                Delivery Providers    Delivering clinician:      Provider Role     Obstetrician     Primary Nurse     Primary [place] Nurse     NICU Nurse     Neonatologist     Anesthesiologist     Nurse Anesthetist     Nurse Practitioner     Midwife     Nursery Nurse              [place] Assessment    Living Status: Living        Skin Color:   Heart Rate:   Reflex Irritability:   Muscle Tone:   Respiratory Effort:   Total:            1 Minute:    1    2    2    1    2    8         5 Minute:    1    2    2    2    2    9                                        Apgars Assigned By: JOSE MARIA ELISE              Resuscitation    Method: Bulb Suction, Stimulation              Measurements      Birth Weight: 3160 g   Birth Length: 52.1 cm     Head Circumference: 33 cm

## 2024-04-24 NOTE — OP NOTE
Department of Obstetrics and Gynecology   Section Note    Indications: failure to progress - arrest of dilation, failure to progress - arrest of descent, and non-reassuring fetal status    Pre-operative Diagnosis: 38 week 2 day pregnancy.    Post-operative Diagnosis: Living  infant(s) and Male    Surgeon: Rogelio Amin MD     Assistants: Tiffany Ramos CNM    Anesthesia: Epidural anesthesia    ASA Class: 1    Procedure Details   The patient was seen in the Holding Room. The risks, benefits, complications, treatment options, and expected outcomes were discussed with the patient.  The patient concurred with the proposed plan, giving informed consent.  The site of surgery properly noted/marked. The patient was taken to Operating Room # 1, identified as Valentine Hicks and the procedure verified as  Delivery. A Time Out was held and the above information confirmed.    After induction of anesthesia, the patient was draped and prepped in the usual sterile manner. A Pfannenstiel incision was made and carried down through the subcutaneous tissue to the fascia. Fascial incision was made and extended transversely. The fascia was  from the underlying rectus tissue superiorly and inferiorly. The peritoneum was identified and entered. Peritoneal incision was extended longitudinally. The utero-vesical peritoneal reflection was incised transversely and the bladder flap was bluntly freed from the lower uterine segment. A low transverse uterine incision was made. Delivered from vertex, occiput posterior  presentation was a male  gram n/a with Apgar scores of 8 at one minute and 9 at five minutes. After the umbilical cord was clamped and cut cord blood was obtained for evaluation. The placenta was removed intact and appeared normal. The uterine outline, tubes and ovaries appeared normal. The uterine incision was closed with running locked sutures of 0 Vicryl. Hemostasis was observed. Lavage was

## 2024-04-24 NOTE — ANESTHESIA POSTPROCEDURE EVALUATION
Department of Anesthesiology  Postprocedure Note    Patient: Valentine Hicks  MRN: [id number]  YOB: 2000  Date of evaluation: 4/23/2024    Procedure Summary       Date: 04/23/24 Room / Location:     Anesthesia Start: 1120 Anesthesia Stop:     Procedure: Labor Analgesia Diagnosis:     Scheduled Providers:  Responsible Provider: aFdy Agustin APRN - CRNA    Anesthesia Type: epidural, CSE ASA Status: 2            Anesthesia Type: No value filed.    Nydia Phase I:      Nydia Phase II:      Anesthesia Post Evaluation    Patient location during evaluation: PACU  Patient participation: complete - patient participated  Level of consciousness: awake and alert  Airway patency: patent  Nausea & Vomiting: no nausea and no vomiting  Respiratory status: acceptable  Hydration status: stable  Comments: Blood pressure 125/73, pulse (!) 103, temperature 98.3 °F (36.8 °C), temperature source Temporal, resp. rate 19, last menstrual period 07/30/2023, SpO2 100 %, not currently breastfeeding.      Pain management: adequate    No notable events documented.

## 2024-04-24 NOTE — FLOWSHEET NOTE
Pt transferred to room 215 via stretcher per this RN. Pt transferred to bed and tolerated well. Room orientation complete. POC discussed and pt verbalizes understanding. Call light within reach.

## 2024-04-25 LAB — RPR SER QL: REACTIVE

## 2024-04-25 PROCEDURE — 6370000000 HC RX 637 (ALT 250 FOR IP): Performed by: OBSTETRICS & GYNECOLOGY

## 2024-04-25 PROCEDURE — 1220000000 HC SEMI PRIVATE OB R&B

## 2024-04-25 RX ORDER — IBUPROFEN 400 MG/1
800 TABLET ORAL EVERY 8 HOURS
Status: DISCONTINUED | OUTPATIENT
Start: 2024-04-25 | End: 2024-04-26 | Stop reason: HOSPADM

## 2024-04-25 RX ADMIN — DOCUSATE SODIUM 100 MG: 100 CAPSULE, LIQUID FILLED ORAL at 21:27

## 2024-04-25 RX ADMIN — IBUPROFEN 800 MG: 400 TABLET, FILM COATED ORAL at 15:58

## 2024-04-25 RX ADMIN — DOCUSATE SODIUM 100 MG: 100 CAPSULE, LIQUID FILLED ORAL at 08:13

## 2024-04-25 RX ADMIN — ACETAMINOPHEN 1000 MG: 500 TABLET ORAL at 12:14

## 2024-04-25 RX ADMIN — PRENATAL VIT W/ FE FUMARATE-FA TAB 27-0.8 MG 1 TABLET: 27-0.8 TAB at 08:13

## 2024-04-25 RX ADMIN — ACETAMINOPHEN 1000 MG: 500 TABLET ORAL at 03:04

## 2024-04-25 RX ADMIN — ACETAMINOPHEN 1000 MG: 500 TABLET ORAL at 21:27

## 2024-04-25 RX ADMIN — IBUPROFEN 800 MG: 400 TABLET, FILM COATED ORAL at 08:13

## 2024-04-25 ASSESSMENT — PAIN SCALES - GENERAL
PAINLEVEL_OUTOF10: 1
PAINLEVEL_OUTOF10: 3
PAINLEVEL_OUTOF10: 0
PAINLEVEL_OUTOF10: 1
PAINLEVEL_OUTOF10: 1

## 2024-04-25 ASSESSMENT — PAIN DESCRIPTION - LOCATION
LOCATION: ABDOMEN
LOCATION: ABDOMEN

## 2024-04-25 ASSESSMENT — PAIN - FUNCTIONAL ASSESSMENT
PAIN_FUNCTIONAL_ASSESSMENT: ACTIVITIES ARE NOT PREVENTED
PAIN_FUNCTIONAL_ASSESSMENT: ACTIVITIES ARE NOT PREVENTED

## 2024-04-25 ASSESSMENT — PAIN DESCRIPTION - DESCRIPTORS
DESCRIPTORS: DULL;ACHING
DESCRIPTORS: ACHING

## 2024-04-25 NOTE — LACTATION NOTE
Mother is aware of the benefits of breastfeeding and chooses to formula feed at this time. Suppression information given. Mother knows when to call MD if needed. Formula feeding booklet given.

## 2024-04-25 NOTE — PLAN OF CARE
Problem: Vaginal Birth or  Section  Goal: Fetal and maternal status remain reassuring during the birth process  Description:  Birth OB-Pregnancy care plan goal which identifies if the fetal and maternal status remain reassuring during the birth process  Outcome: Progressing     Problem: Postpartum  Goal: Experiences normal postpartum course  Description:  Postpartum OB-Pregnancy care plan goal which identifies if the mother is experiencing a normal postpartum course  Outcome: Progressing  Goal: Appropriate maternal -  bonding  Description:  Postpartum OB-Pregnancy care plan goal which identifies if the mother and  are bonding appropriately  Outcome: Progressing  Goal: Establishment of infant feeding pattern  Description:  Postpartum OB-Pregnancy care plan goal which identifies if the mother is establishing a feeding pattern with their   Outcome: Progressing  Goal: Incisions, wounds, or drain sites healing without S/S of infection  Outcome: Progressing     Problem: Pain  Goal: Verbalizes/displays adequate comfort level or baseline comfort level  Outcome: Progressing     Problem: Infection - Adult  Goal: Absence of infection at discharge  Outcome: Progressing  Goal: Absence of infection during hospitalization  Outcome: Progressing  Goal: Absence of fever/infection during anticipated neutropenic period  Outcome: Progressing     Problem: Safety - Adult  Goal: Free from fall injury  Outcome: Progressing     Problem: Discharge Planning  Goal: Discharge to home or other facility with appropriate resources  Outcome: Progressing     Problem: Chronic Conditions and Co-morbidities  Goal: Patient's chronic conditions and co-morbidity symptoms are monitored and maintained or improved  Outcome: Progressing     Problem: Skin/Tissue Integrity  Goal: Absence of new skin breakdown  Description: 1.  Monitor for areas of redness and/or skin breakdown  2.  Assess vascular access sites hourly  3.  Every 4-6  hours minimum:  Change oxygen saturation probe site  4.  Every 4-6 hours:  If on nasal continuous positive airway pressure, respiratory therapy assess nares and determine need for appliance change or resting period.  Outcome: Progressing

## 2024-04-25 NOTE — CARE COORDINATION
Update: CHEN Almaguer met with Pt at bedside along with Pt spouse to discuss any needs or concerns at that time. Pt reported needing to speak with [place] Nurse, Bebe and to go over breast feeding and some frustrations she is having. Pt reported she wanted additional information on [employer] [facility] for breast feeding. This writer provided this information for the Pt and and spoke with the [facility] Nurse pertaining to Pt request to see her. Electronically signed by Evie Condon on 4/25/2024 at 12:12 PM

## 2024-04-25 NOTE — FLOWSHEET NOTE
Rounding completed at this time. Education provided on feeding baby and additional washcloths provided for burping. No additional questions or concerns voiced at this time.

## 2024-04-26 VITALS
TEMPERATURE: 97.5 F | RESPIRATION RATE: 18 BRPM | OXYGEN SATURATION: 98 % | DIASTOLIC BLOOD PRESSURE: 92 MMHG | HEART RATE: 92 BPM | SYSTOLIC BLOOD PRESSURE: 140 MMHG

## 2024-04-26 PROCEDURE — 6370000000 HC RX 637 (ALT 250 FOR IP): Performed by: OBSTETRICS & GYNECOLOGY

## 2024-04-26 PROCEDURE — 99239 HOSP IP/OBS DSCHRG MGMT >30: CPT | Performed by: NURSE PRACTITIONER

## 2024-04-26 RX ORDER — IBUPROFEN 800 MG/1
800 TABLET ORAL 3 TIMES DAILY PRN
Qty: 90 TABLET | Refills: 0 | Status: SHIPPED | OUTPATIENT
Start: 2024-04-26 | End: 2024-05-03 | Stop reason: ALTCHOICE

## 2024-04-26 RX ORDER — OXYCODONE HYDROCHLORIDE 10 MG/1
10 TABLET ORAL EVERY 4 HOURS PRN
Qty: 18 TABLET | Refills: 0 | Status: SHIPPED | OUTPATIENT
Start: 2024-04-26 | End: 2024-04-29

## 2024-04-26 RX ADMIN — IBUPROFEN 800 MG: 400 TABLET, FILM COATED ORAL at 00:15

## 2024-04-26 RX ADMIN — PRENATAL VIT W/ FE FUMARATE-FA TAB 27-0.8 MG 1 TABLET: 27-0.8 TAB at 09:15

## 2024-04-26 RX ADMIN — ACETAMINOPHEN 1000 MG: 500 TABLET ORAL at 03:36

## 2024-04-26 RX ADMIN — IBUPROFEN 800 MG: 400 TABLET, FILM COATED ORAL at 09:15

## 2024-04-26 RX ADMIN — DOCUSATE SODIUM 100 MG: 100 CAPSULE, LIQUID FILLED ORAL at 09:15

## 2024-04-26 ASSESSMENT — PAIN DESCRIPTION - DESCRIPTORS
DESCRIPTORS: CRAMPING

## 2024-04-26 ASSESSMENT — PAIN DESCRIPTION - LOCATION
LOCATION: ABDOMEN

## 2024-04-26 ASSESSMENT — PAIN SCALES - GENERAL
PAINLEVEL_OUTOF10: 5
PAINLEVEL_OUTOF10: 1
PAINLEVEL_OUTOF10: 3

## 2024-04-26 ASSESSMENT — PAIN - FUNCTIONAL ASSESSMENT
PAIN_FUNCTIONAL_ASSESSMENT: ACTIVITIES ARE NOT PREVENTED

## 2024-04-26 ASSESSMENT — PAIN DESCRIPTION - ORIENTATION: ORIENTATION: LOWER

## 2024-04-26 NOTE — FLOWSHEET NOTE
MRACELO Ramos cnm notified of pts elevated b/ps, no new orders at this time. Will continue to monitor.

## 2024-04-26 NOTE — FLOWSHEET NOTE
Discharge teaching reviewed, patient verbalizes understanding. Denies any questions. Follow up appointments reviewed.

## 2024-04-26 NOTE — PROGRESS NOTES
CLINICAL PHARMACY NOTE: MEDS TO BEDS    Total # of Prescriptions Filled: 2   The following medications were delivered to the patient:  Discharge Medication List as of 4/26/2024 10:27 AM        START taking these medications    Details   oxyCODONE HCl (OXY-IR) 10 MG immediate release tablet Take 1 tablet by mouth every 4 hours as needed for Pain for up to 3 days. Max Daily Amount: 60 mg, Disp-18 tablet, R-0Normal      ibuprofen (ADVIL;MOTRIN) 800 MG tablet Take 1 tablet by mouth 3 times daily as needed for Pain, Disp-90 tablet, R-0Normal               Additional Documentation:    Delivered RX to patient bedside. No copay.

## 2024-04-28 LAB — RPR SER-TITR: ABNORMAL {TITER}

## 2024-04-29 ENCOUNTER — TELEPHONE (OUTPATIENT)
Dept: OBGYN CLINIC | Age: 24
End: 2024-04-29

## 2024-04-29 LAB — T PALLIDUM AB SER QL AGGL: NON REACTIVE

## 2024-04-29 NOTE — DISCHARGE SUMMARY
[facility] OB/GYN   Discharge Summary    Patient Name: Valentine Hicks  Patient : 2000  Room/Bed: 0215/0215-01  Primary Care Physician: Jaime Quinones DO  Admit Date: 2024  4:15 AM    Reasons for Admission on 2024  4:15 AM  38 weeks gestation of pregnancy [Z3A.38]  No comment available  Onset of Labor  ROM (Premature)    Patient Active Problem List   Diagnosis    Anxiety    Non-recurrent acute serous otitis media of both ears    Biological false positive RPR test    Family history of congenital heart defect    Obesity affecting pregnancy, antepartum    Anxiety disorder affecting pregnancy, antepartum    38 weeks gestation of pregnancy    Born by  section       Valentine Hicks is a 23 y.o. year old  who presented at 38w2d gestation with Rupture of Membranes (Thinks her water broke around 0240 this morning. )  . Her pregnancy has been complicated by Obesity and +RPR with neg TPA, anxiety/depression    Please see H&P for detailed information.     She was admitted and in labor with pitocin augmentation and Epidural anesthesia with failure to progress and delivered via  Section.  No Immediate complications were encountered.   Please see procedure note for detailed information.     Her postpartum course has been unremarkable. See H&H history below. She had no signs or symptoms of acute blood loss anemia. She is ambulating well, voiding without difficulty and her lochia is within normal limits. She is feeding by bottle without difficulty. She was stable for discharge on postpartum day 3.     Hemoglobin   Date Value Ref Range Status   2024 11.6 (L) 12.0 - 16.0 g/dL Final   2024 12.7 12.0 - 16.0 g/dL Final     Hematocrit   Date Value Ref Range Status   2024 33.3 (L) 37.0 - 47.0 % Final   2024 38.3 37.0 - 47.0 % Final         Prenatal Procedures  None    Intrapartum Procedures   Delivery: : Low Cervical, Transverse and See Labor and

## 2024-05-03 ENCOUNTER — POSTPARTUM VISIT (OUTPATIENT)
Dept: OBGYN CLINIC | Age: 24
End: 2024-05-03

## 2024-05-03 VITALS
WEIGHT: 220 LBS | DIASTOLIC BLOOD PRESSURE: 87 MMHG | BODY MASS INDEX: 41.57 KG/M2 | SYSTOLIC BLOOD PRESSURE: 135 MMHG | HEART RATE: 93 BPM

## 2024-05-03 DIAGNOSIS — F41.9 ANXIETY: ICD-10-CM

## 2024-05-03 DIAGNOSIS — Z11.1 PPD SCREENING TEST: Primary | ICD-10-CM

## 2024-05-03 RX ORDER — CITALOPRAM HYDROBROMIDE 10 MG/1
10 TABLET ORAL DAILY
Qty: 90 TABLET | Refills: 1 | Status: SHIPPED | OUTPATIENT
Start: 2024-05-03

## 2024-05-03 NOTE — PATIENT INSTRUCTIONS
Patient Education         Section: What to Expect at Home  Your Recovery     A  section, or , is surgery to deliver your baby through a cut that the doctor makes in your lower belly and uterus. The cut is called an incision.  You may have some pain in your lower belly and need pain medicine for 1 to 2 weeks. You can expect some vaginal bleeding for several weeks. You will probably need about 6 weeks to fully recover.  It's important to take it easy while the incision heals. Avoid heavy lifting, strenuous activities, and exercises that strain the belly muscles while you recover. Ask a family member or friend for help with housework, cooking, and shopping.  This care sheet gives you a general idea about how long it will take for you to recover. But each person recovers at a different pace. Follow the steps below to get better as quickly as possible.  How can you care for yourself at home?  Activity    Rest when you feel tired. Getting enough sleep will help you recover.     Try to walk each day. Start by walking a little more than you did the day before. Bit by bit, increase the amount you walk. Walking boosts blood flow and helps prevent pneumonia, constipation, and blood clots.     Avoid strenuous activities, such as bicycle riding, jogging, weightlifting, and aerobic exercise, for 6 weeks or until your doctor says it is okay.     Until your doctor says it is okay, do not lift anything heavier than your baby.     Do not do sit-ups or other exercises that strain the belly muscles for 6 weeks or until your doctor says it is okay.     Hold a pillow over your incision when you cough or take deep breaths. This will support your belly and decrease your pain.     You may shower as usual. Pat the incision dry when you are done.     You will have some vaginal bleeding. Wear sanitary pads. Do not douche or use tampons until your doctor says it is okay.     Ask your doctor when you can drive again.

## 2024-05-03 NOTE — PROGRESS NOTES
[facility] OB/GYN  CNM Office Note    Valentine Hicks is a 23 y.o. female who presents today for her medical conditions/ complaints as noted below.  Chief Complaint   Patient presents with    Postpartum Care     The patient returns for her post-partum visit.  All information below was reviewed with her.     Visit Reason:  Post-Partum Visit       Baby's Name:  Leodan        Delivery Date:  24       Type of Delivery:  c section        Feeding: Formula        LMP:  2023       Contraceptive Choices:        Last PAP:  2023       Depression: score 15     Problems:   Pt states she has had some baby blues since being home, her moods up and down. She feels like taking prenatals and getting a little more sleep has helped with these issues.           Patient Active Problem List   Diagnosis    Anxiety    Non-recurrent acute serous otitis media of both ears    Biological false positive RPR test    Family history of congenital heart defect    Obesity affecting pregnancy, antepartum    Anxiety disorder affecting pregnancy, antepartum    38 weeks gestation of pregnancy    Born by  section    Postpartum depression    Post partum depression       Patient's last menstrual period was 2023.      Past Medical History:   Diagnosis Date    ADHD (attention deficit hyperactivity disorder)     Anxiety 2019    Obesity affecting pregnancy, antepartum 2/15/2024    Postpartum depression 2024     Past Surgical History:   Procedure Laterality Date     SECTION N/A 2024     SECTION performed by Rogelio Amin MD at [facility] L[facility] OR     No family history on file.  Social History     Tobacco Use    Smoking status: Never    Smokeless tobacco: Never   Substance Use Topics    Alcohol use: No       Current Outpatient Medications   Medication Sig Dispense Refill    citalopram (CELEXA) 10 MG tablet Take 1 tablet by mouth daily 90 tablet 1    Prenatal Vit-Fe Fumarate-FA (PRENATAL VITAMINS)

## 2024-05-03 NOTE — PROGRESS NOTES
The patient returns for her post-partum visit.  All information below was reviewed with her.    Visit Reason:  Post-Partum Visit       Baby's Name:  Leodan        Delivery Date:  4/23/24       Type of Delivery:  c section        Feeding: Formula        LMP:  7/30/2023       Contraceptive Choices:        Last PAP:  4/2023       Depression:     Problems:   Pt states she has had some baby blues since being home, her moods up and down. She feels like taking prenatals and getting a little more sleep has helped with these issues.

## 2024-05-08 ENCOUNTER — HOSPITAL ENCOUNTER (OUTPATIENT)
Age: 24
Setting detail: OBSERVATION
Discharge: HOME | End: 2024-05-09
Attending: NURSE PRACTITIONER | Admitting: NURSE PRACTITIONER
Payer: COMMERCIAL

## 2024-05-08 VITALS
SYSTOLIC BLOOD PRESSURE: 130 MMHG | TEMPERATURE: 98.2 F | RESPIRATION RATE: 16 BRPM | DIASTOLIC BLOOD PRESSURE: 77 MMHG | OXYGEN SATURATION: 96 % | HEART RATE: 76 BPM

## 2024-05-08 PROCEDURE — G0378 HOSPITAL OBSERVATION PER HR: HCPCS

## 2024-05-08 SDOH — ECONOMIC STABILITY: INCOME INSECURITY: HOW HARD IS IT FOR YOU TO PAY FOR THE VERY BASICS LIKE FOOD, HOUSING, MEDICAL CARE, AND HEATING?: NOT HARD AT ALL

## 2024-05-08 SDOH — ECONOMIC STABILITY: FOOD INSECURITY: WITHIN THE PAST 12 MONTHS, YOU WORRIED THAT YOUR FOOD WOULD RUN OUT BEFORE YOU GOT MONEY TO BUY MORE.: NEVER TRUE

## 2024-05-08 SDOH — ECONOMIC STABILITY: INCOME INSECURITY: IN THE PAST 12 MONTHS, HAS THE ELECTRIC, GAS, OIL, OR WATER COMPANY THREATENED TO SHUT OFF SERVICE IN YOUR HOME?: NO

## 2024-05-08 ASSESSMENT — PATIENT HEALTH QUESTIONNAIRE - PHQ9
SUM OF ALL RESPONSES TO PHQ9 QUESTIONS 1 & 2: 2
SUM OF ALL RESPONSES TO PHQ QUESTIONS 1-9: 2
2. FEELING DOWN, DEPRESSED OR HOPELESS: SEVERAL DAYS
1. LITTLE INTEREST OR PLEASURE IN DOING THINGS: SEVERAL DAYS
SUM OF ALL RESPONSES TO PHQ QUESTIONS 1-9: 2

## 2024-05-08 NOTE — FLOWSHEET NOTE
Tiffany Beauchamp JAVON notified of patient concerns about feeling overwhelmed and concerns about not being to take care of the infant. Orders received to admit out patient observation for postpartum depression.

## 2024-05-09 PROCEDURE — G0378 HOSPITAL OBSERVATION PER HR: HCPCS

## 2024-05-09 RX ORDER — HYDROXYZINE HYDROCHLORIDE 25 MG/1
25 TABLET, FILM COATED ORAL 3 TIMES DAILY PRN
Qty: 30 TABLET | Refills: 1 | Status: SHIPPED | OUTPATIENT
Start: 2024-05-09 | End: 2024-06-08

## 2024-05-09 RX ORDER — HYDROXYZINE HYDROCHLORIDE 25 MG/1
25 TABLET, FILM COATED ORAL 3 TIMES DAILY PRN
Status: DISCONTINUED | OUTPATIENT
Start: 2024-05-09 | End: 2024-05-09 | Stop reason: HOSPADM

## 2024-05-09 NOTE — DISCHARGE INSTRUCTIONS
Evie provided Pt with community resources written and provided to the Pt at D/C.   This writer scheduled Pt an appointment with [facility] on May 14th,2024 at 10:00 AM at [address] [place] [address] (go to Adult Services)  Mental Health appointment. Please arrive 30 minutes early to fill out paperwork bring photo ID, social Security car and Health insurance Card. After hour hotline [phone]. This writer completed Post Partum plan to take home to refer to and a safety plan these documents were provided to the Pt to have for D/C and filed in the Pt chart. Electronically signed by Evie Condon on 5/9/2024 at 12:22 PM

## 2024-05-09 NOTE — FLOWSHEET NOTE
While sitting with patient, patient stated that the baby tends to sleep a lot during the day, and cluster feeds at night, causing her to lose sleep. Says her  cannot help her at night with the baby because he works 3 jobs (1 full time, and 2 part time) and money is tight. She said she has a job lined up to start in August.     Patient stated she feeds baby 2.5 oz of formula at a time, but when he is cluster feeding, he might eat that much every hour or 2. This nurse suggested increasing the feed slightly to see how baby tolerates it and to see if he will sleep longer stretches. She was also concerned that baby \"moves around a lot in his sleep.\" I told her that was normal, as even adults move around a lot in their sleep. His movement wakes her up or keeps her on edge because she thinks he is about to wake up.     Patient also asked how she \"could tell if the baby was hungry vs just needing to poop.\"     This nurse offered to feed and take care of baby through the night so the mother could get some sleep. The patient agreed, but didn't want to send the baby out of the room at this time.

## 2024-05-09 NOTE — CARE COORDINATION
Evie Condon contacted   
 Evie provided Pt with community resources written and provided to the Pt at D/C.   This writer scheduled Pt an appointment with [facility] on May 14th,2024 at 10:00 AM at [address] [place] [address] (go to Adult Services)  Mental Health appointment. Please arrive 30 minutes early to fill out paperwork bring photo ID, social Security car and Health insurance Card. This writer completed Post Partum plan to take home to refer to and a safety plan these documents were provided to the Pt to have for D/C and filed in the Pt chart. After hour hotline [phone] Pt reported having a scheduled appointment today at 3:15 PM HANDS program with her  . This writer completed Pt safety plan with Pt prior to Pt D/C. Electronically signed by Evie Condon on 5/9/2024 at 12:22 PM   
she goes to sleep her  is a sound sleep and will not hear the baby. Pt reported , \" the other day the baby was crying a lot she had the baby in bed holding him and her  never woke up. My brother has Asperger Syndrome and feels like she is on the spectrum or has Asperger herself, but never diagnosed.   Electronically signed by Evie Condon on 5/8/2024 at 1:36 PM

## 2024-05-09 NOTE — FLOWSHEET NOTE
Patient has order for sitter to be at bedside when  is not present. Upon entering the patients room for change of shift bedside report, patient stated that her  had already left for the night. Mother was alone in room with infant, and was acting appropriately. This nurse called clinical house to inquire about a sitter. Was told to use our tech as a sitter unless we had a delivery for the tech to attend. We are to notify clinical house if the tech is pulled from the room. Charge nurse and  notified. According to , a nurse and sitter should take turns sitting with the patient through the night.
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
## 2024-05-09 NOTE — FLOWSHEET NOTE
Patient slept from 2130 last night to 0630 this morning. Patient states feeling well rested.     Gave updates on infant. Suggested to increase infants feed to 4 oz at a time to satisfy infant between feeds. Also asked patient if she had any Velcro swaddle sacks at home for the infant to help him sleep. She stated she received a couple from [facility], but that they were too big at this time.

## 2024-05-09 NOTE — FLOWSHEET NOTE
Dr Mcgrath assessed patient. Patient in better spirits this morning after some much needed rest. Tiffany Ramos updated

## 2024-06-07 ENCOUNTER — POSTPARTUM VISIT (OUTPATIENT)
Dept: OBGYN CLINIC | Age: 24
End: 2024-06-07

## 2024-06-07 VITALS
HEART RATE: 85 BPM | BODY MASS INDEX: 38.17 KG/M2 | SYSTOLIC BLOOD PRESSURE: 125 MMHG | WEIGHT: 202 LBS | DIASTOLIC BLOOD PRESSURE: 79 MMHG

## 2024-06-07 DIAGNOSIS — F41.9 ANXIETY: ICD-10-CM

## 2024-06-07 DIAGNOSIS — Z11.1 PPD SCREENING TEST: Primary | ICD-10-CM

## 2024-06-07 PROCEDURE — 0503F POSTPARTUM CARE VISIT: CPT | Performed by: NURSE PRACTITIONER

## 2024-06-07 PROCEDURE — S3005 EVAL SELF-ASSESS DEPRESSION: HCPCS | Performed by: NURSE PRACTITIONER

## 2024-06-07 ASSESSMENT — ENCOUNTER SYMPTOMS
RESPIRATORY NEGATIVE: 1
EYES NEGATIVE: 1
ALLERGIC/IMMUNOLOGIC NEGATIVE: 1
GASTROINTESTINAL NEGATIVE: 1

## 2024-06-07 NOTE — PROGRESS NOTES
[facility]  CNM Office Note    Valentine Hicks is a 23 y.o. female who presents today for her medical conditions/ complaints as noted below.  Chief Complaint   Patient presents with    Postpartum Care         HPI  The patient returns for her post-partum visit.  All information below was reviewed with her.     Visit Reason:  Post-Partum Visit       Baby's Name:  Leodan        Delivery Date:  24       Type of Delivery:  c section        Feeding: Formula        LMP:  2023       Contraceptive Choices: IUD       Last PAP:  2023       Depression: score      Problems:  Pt states no c/o at this time. She would like to discuss the IUD.     Patient Active Problem List   Diagnosis    Anxiety    Non-recurrent acute serous otitis media of both ears    Biological false positive RPR test    Family history of congenital heart defect    Obesity affecting pregnancy, antepartum    Anxiety disorder affecting pregnancy, antepartum    38 weeks gestation of pregnancy    Born by  section    Postpartum depression    Post partum depression       Patient's last menstrual period was 2024.      Past Medical History:   Diagnosis Date    ADHD (attention deficit hyperactivity disorder)     Anxiety 2019    Obesity affecting pregnancy, antepartum 2/15/2024    Postpartum depression 2024     Past Surgical History:   Procedure Laterality Date     SECTION N/A 2024     SECTION performed by Rogelio Amin MD at [facility] L&D OR     No family history on file.  Social History     Tobacco Use    Smoking status: Never    Smokeless tobacco: Never   Substance Use Topics    Alcohol use: No       Current Outpatient Medications   Medication Sig Dispense Refill    citalopram (CELEXA) 10 MG tablet Take 1 tablet by mouth daily 90 tablet 1    hydrOXYzine HCl (ATARAX) 25 MG tablet Take 1 tablet by mouth 3 times daily as needed for Anxiety (Patient not taking: Reported on 2024) 30 tablet 1

## 2024-06-07 NOTE — PATIENT INSTRUCTIONS
support group for new parents.  When should you call for help?   Call 911 if:    You feel you cannot stop from hurting yourself, your baby, or someone else.   Where to get help 24 hours a day, 7 days a week   If you or someone you know talks about suicide, self-harm, a mental health crisis, a substance use crisis, or any other kind of emotional distress, get help right away. You can:    Call the Suicide and Crisis Lifeline at 018.     Call 1-428-781-TALK (1-537.527.8650).     Text HOME to 243296 to access the Crisis Text Line.   Consider saving these numbers in your phone.  Go to [employer] for more information or to chat online.  Call your doctor now or seek immediate medical care if:    You are having trouble caring for yourself or your baby.     You hear voices.   Contact your doctor if:    You have problems with your medicines.     You do not get better as expected.   Follow-up care is a key part of your treatment and safety. Be sure to make and go to all appointments, and call your doctor if you are having problems. It's also a good idea to know your test results and keep a list of the medicines you take.  Where can you learn more?  Go to [email or web address].net/patientEd and enter Y765 to learn more about \"Depression After Childbirth: Care Instructions.\"  Current as of: June 24, 2023  Content Version: 14.1  © 5278-0147 [employer].   Care instructions adapted under license by the grafter. If you have questions about a medical condition or this instruction, always ask your healthcare professional. [employer] disclaims any warranty or liability for your use of this information.

## 2024-06-07 NOTE — PROGRESS NOTES
The patient returns for her post-partum visit.  All information below was reviewed with her.     Visit Reason:  Post-Partum Visit       Baby's Name:  Loedan        Delivery Date:  4/23/24       Type of Delivery:  c section        Feeding: Formula        LMP:  7/30/2023       Contraceptive Choices: IUD       Last PAP:  4/2023       Depression: score      Problems:  Pt states no c/o at this time. She would like to discuss the IUD.

## 2024-06-27 ENCOUNTER — PROCEDURE VISIT (OUTPATIENT)
Dept: OBGYN CLINIC | Age: 24
End: 2024-06-27

## 2024-06-27 VITALS — SYSTOLIC BLOOD PRESSURE: 125 MMHG | WEIGHT: 202 LBS | BODY MASS INDEX: 38.17 KG/M2 | DIASTOLIC BLOOD PRESSURE: 79 MMHG

## 2024-06-27 DIAGNOSIS — Z30.430 ENCOUNTER FOR IUD INSERTION: Primary | ICD-10-CM

## 2024-06-27 LAB
CONTROL: NORMAL
PREGNANCY TEST URINE, POC: NORMAL

## 2024-06-27 NOTE — PROGRESS NOTES
NDC: 16972-605-72  LOT: [id number]  EXP: 05/2026  
    No current facility-administered medications for this visit.       Objective:  /79   Wt 91.6 kg (202 lb)   LMP 06/03/2024   BMI 38.17 kg/m²     Results for orders placed or performed in visit on 06/27/24   POCT urine pregnancy   Result Value Ref Range    Preg Test, Ur neg     Control neg        Physical Examination:  General Examination:  GENERAL APPEARANCE: pleasant, well nourished, well developed, in no acute distress, female.   ABDOMEN: bowel sounds present, soft, nontender, nondistended, no hepatosplenomegaly, no masses palpable.   SKIN: no suspicious lesions, warm and dry.   PSYCH: alert, oriented.     Gynecological:  EXTERNAL GENITALIA: introitus normal, no lesions.   URETHRA: normal.   VAGINA: normal, healthy pink mucosa without any lesions, normal discharge.   CERVIX: normal appearance, no cervical movement tenderness, no lesions or discharge or bleeding.   UTERUS: normal mobility, nontender, normal size.   ADNEXA: no mass, nontender.

## 2024-06-27 NOTE — PATIENT INSTRUCTIONS
6/28/2022.  Care instructions adapted under license by [employer]. If you have questions about a medical condition or this instruction, always ask your healthcare professional. [employer] disclaims any warranty or liability for your use of this information.

## 2024-07-13 ENCOUNTER — OFFICE VISIT (OUTPATIENT)
Age: 24
End: 2024-07-13
Payer: COMMERCIAL

## 2024-07-13 VITALS
OXYGEN SATURATION: 98 % | DIASTOLIC BLOOD PRESSURE: 66 MMHG | TEMPERATURE: 97.3 F | RESPIRATION RATE: 20 BRPM | SYSTOLIC BLOOD PRESSURE: 120 MMHG | BODY MASS INDEX: 38.92 KG/M2 | WEIGHT: 206 LBS | HEART RATE: 102 BPM

## 2024-07-13 DIAGNOSIS — N93.9 VAGINAL BLEEDING: ICD-10-CM

## 2024-07-13 DIAGNOSIS — Z30.431 CHECKING OF INTRAUTERINE DEVICE: ICD-10-CM

## 2024-07-13 DIAGNOSIS — N92.0 MENORRHAGIA WITH REGULAR CYCLE: Primary | ICD-10-CM

## 2024-07-13 LAB
APPEARANCE FLUID: ABNORMAL
BILIRUBIN, POC: ABNORMAL
BLOOD URINE, POC: ABNORMAL
CLARITY, POC: ABNORMAL
COLOR, POC: ABNORMAL
CONTROL: NORMAL
GLUCOSE URINE, POC: ABNORMAL
KETONES, POC: ABNORMAL
LEUKOCYTE EST, POC: ABNORMAL
NITRITE, POC: ABNORMAL
PH, POC: 5.5
PREGNANCY TEST URINE, POC: NORMAL
PROTEIN, POC: ABNORMAL
SPECIFIC GRAVITY, POC: >1.03
UROBILINOGEN, POC: 0.2

## 2024-07-13 PROCEDURE — 81002 URINALYSIS NONAUTO W/O SCOPE: CPT

## 2024-07-13 PROCEDURE — 81025 URINE PREGNANCY TEST: CPT

## 2024-07-13 PROCEDURE — G8427 DOCREV CUR MEDS BY ELIG CLIN: HCPCS

## 2024-07-13 PROCEDURE — 99213 OFFICE O/P EST LOW 20 MIN: CPT

## 2024-07-13 PROCEDURE — 1036F TOBACCO NON-USER: CPT

## 2024-07-13 PROCEDURE — G8417 CALC BMI ABV UP PARAM F/U: HCPCS

## 2024-07-13 ASSESSMENT — ENCOUNTER SYMPTOMS
STRIDOR: 0
ABDOMINAL PAIN: 0
SHORTNESS OF BREATH: 0
EYE PAIN: 0
SORE THROAT: 0
WHEEZING: 0
EYE REDNESS: 0
VOMITING: 0
APNEA: 0
CHEST TIGHTNESS: 0
SINUS PAIN: 0
EYE DISCHARGE: 0
CONSTIPATION: 0
FACIAL SWELLING: 0
NAUSEA: 0
CHOKING: 0
ABDOMINAL DISTENTION: 0
COUGH: 0
DIARRHEA: 0
SINUS PRESSURE: 0
TROUBLE SWALLOWING: 0
COLOR CHANGE: 0

## 2024-07-13 NOTE — PROGRESS NOTES
erythema or rash.   Neurological:      General: No focal deficit present.      Mental Status: She is alert and oriented to person, place, and time.      Deep Tendon Reflexes: Reflexes are normal and symmetric.   Psychiatric:         Attention and Perception: Attention normal.         Mood and Affect: Mood normal.         Behavior: Behavior normal. Behavior is cooperative.         Thought Content: Thought content normal.         /66   Pulse (!) 102   Temp 97.3 °F (36.3 °C) (Temporal)   Resp 20   Wt 93.4 kg (206 lb)   LMP 07/10/2024   SpO2 98%   BMI 38.92 kg/m²     Assessment   Assessment & Plan      Diagnosis Orders   1. Menorrhagia with regular cycle  POCT Urinalysis no Micro    POCT urine pregnancy    US ABDOMEN COMPLETE      2. Vaginal bleeding  POCT Urinalysis no Micro    POCT urine pregnancy    US ABDOMEN COMPLETE      3. Checking of intrauterine device  US ABDOMEN COMPLETE          Plan   Urine pregnancy test was negative    Urinalysis was positive for large blood and trace leukocytes.    US abdomen ordered. We will call with results    Discussed with patient that it is common to have heavy and irregular menstrual bleeding for up to 3 months after having IUD insertion, as was previously advised by OB/GYN.    Advised patient to follow up with OB/GYN on Monday    Report to ER if symptoms worsening or become severe    Any condition can change, despite proper treatment. Therefore, if symptoms still persist or worsen after treatment plan intitated today, either go to the nearest ER, or call PCP, or return to UC for further evaluation.    Urgent Care evaluation today is not a substitute for PCP visit. Follow up care is your responsibility to discuss and review this UC visit.    Discussed use, benefits, and side effects of any prescribed medications. All patient questions were answered. Patient demonstrates understanding and agrees with care plan. Patient was given educational materials - see patient

## 2024-07-13 NOTE — PATIENT INSTRUCTIONS
Urine pregnancy test was negative    Urinalysis was positive for large blood and trace leukocytes.    US abdomen ordered. We will call with results    Advised patient to follow up with OB/GYN on Monday    Report to ER if symptoms worsening or become severe    Any condition can change, despite proper treatment. Therefore, if symptoms still persist or worsen after treatment plan intitated today, either go to the nearest ER, or call PCP, or return to UC for further evaluation.    Urgent Care evaluation today is not a substitute for PCP visit. Follow up care is your responsibility to discuss and review this UC visit.

## 2024-08-01 SDOH — HEALTH STABILITY: PHYSICAL HEALTH: ON AVERAGE, HOW MANY DAYS PER WEEK DO YOU ENGAGE IN MODERATE TO STRENUOUS EXERCISE (LIKE A BRISK WALK)?: 1 DAY

## 2024-08-01 SDOH — HEALTH STABILITY: PHYSICAL HEALTH: ON AVERAGE, HOW MANY MINUTES DO YOU ENGAGE IN EXERCISE AT THIS LEVEL?: 30 MIN

## 2024-08-02 ENCOUNTER — OFFICE VISIT (OUTPATIENT)
Dept: INTERNAL MEDICINE | Age: 24
End: 2024-08-02

## 2024-08-02 VITALS
SYSTOLIC BLOOD PRESSURE: 119 MMHG | BODY MASS INDEX: 38.36 KG/M2 | OXYGEN SATURATION: 98 % | HEART RATE: 90 BPM | DIASTOLIC BLOOD PRESSURE: 77 MMHG | WEIGHT: 203.2 LBS | HEIGHT: 61 IN

## 2024-08-02 DIAGNOSIS — Z76.89 ENCOUNTER TO ESTABLISH CARE: ICD-10-CM

## 2024-08-02 DIAGNOSIS — F98.8 ATTENTION DEFICIT DISORDER, UNSPECIFIED HYPERACTIVITY PRESENCE: ICD-10-CM

## 2024-08-02 DIAGNOSIS — Z13.21 ENCOUNTER FOR VITAMIN DEFICIENCY SCREENING: ICD-10-CM

## 2024-08-02 DIAGNOSIS — F32.89 OTHER DEPRESSION: ICD-10-CM

## 2024-08-02 DIAGNOSIS — Z79.899 HIGH RISK MEDICATION USE: ICD-10-CM

## 2024-08-02 DIAGNOSIS — Z00.00 ROUTINE ADULT HEALTH MAINTENANCE: Primary | ICD-10-CM

## 2024-08-02 LAB

## 2024-08-02 RX ORDER — DEXTROAMPHETAMINE SACCHARATE, AMPHETAMINE ASPARTATE MONOHYDRATE, DEXTROAMPHETAMINE SULFATE AND AMPHETAMINE SULFATE 1.25; 1.25; 1.25; 1.25 MG/1; MG/1; MG/1; MG/1
5 CAPSULE, EXTENDED RELEASE ORAL DAILY
Qty: 30 CAPSULE | Refills: 0 | Status: SHIPPED | OUTPATIENT
Start: 2024-08-02 | End: 2024-09-02

## 2024-08-02 SDOH — ECONOMIC STABILITY: FOOD INSECURITY: WITHIN THE PAST 12 MONTHS, THE FOOD YOU BOUGHT JUST DIDN'T LAST AND YOU DIDN'T HAVE MONEY TO GET MORE.: NEVER TRUE

## 2024-08-02 SDOH — ECONOMIC STABILITY: FOOD INSECURITY: WITHIN THE PAST 12 MONTHS, YOU WORRIED THAT YOUR FOOD WOULD RUN OUT BEFORE YOU GOT MONEY TO BUY MORE.: NEVER TRUE

## 2024-08-02 SDOH — ECONOMIC STABILITY: INCOME INSECURITY: HOW HARD IS IT FOR YOU TO PAY FOR THE VERY BASICS LIKE FOOD, HOUSING, MEDICAL CARE, AND HEATING?: NOT HARD AT ALL

## 2024-08-02 ASSESSMENT — ENCOUNTER SYMPTOMS
EYE DISCHARGE: 0
CHOKING: 0
VOICE CHANGE: 0
RECTAL PAIN: 0
EYE REDNESS: 0
ABDOMINAL DISTENTION: 0
SINUS PRESSURE: 0
VOMITING: 0
DIARRHEA: 0
CHEST TIGHTNESS: 0
FACIAL SWELLING: 0
EYE ITCHING: 0
CONSTIPATION: 0
ABDOMINAL PAIN: 0
SORE THROAT: 0
COUGH: 0
EYE PAIN: 0
RHINORRHEA: 0
BACK PAIN: 0
BLOOD IN STOOL: 0
PHOTOPHOBIA: 0
NAUSEA: 0
SHORTNESS OF BREATH: 0
ANAL BLEEDING: 0
COLOR CHANGE: 0
TROUBLE SWALLOWING: 0
WHEEZING: 0

## 2024-08-02 ASSESSMENT — PATIENT HEALTH QUESTIONNAIRE - PHQ9
SUM OF ALL RESPONSES TO PHQ9 QUESTIONS 1 & 2: 0
SUM OF ALL RESPONSES TO PHQ QUESTIONS 1-9: 1
1. LITTLE INTEREST OR PLEASURE IN DOING THINGS: NOT AT ALL
7. TROUBLE CONCENTRATING ON THINGS, SUCH AS READING THE NEWSPAPER OR WATCHING TELEVISION: SEVERAL DAYS
2. FEELING DOWN, DEPRESSED OR HOPELESS: NOT AT ALL
6. FEELING BAD ABOUT YOURSELF - OR THAT YOU ARE A FAILURE OR HAVE LET YOURSELF OR YOUR FAMILY DOWN: NOT AT ALL
8. MOVING OR SPEAKING SO SLOWLY THAT OTHER PEOPLE COULD HAVE NOTICED. OR THE OPPOSITE, BEING SO FIGETY OR RESTLESS THAT YOU HAVE BEEN MOVING AROUND A LOT MORE THAN USUAL: NOT AT ALL
10. IF YOU CHECKED OFF ANY PROBLEMS, HOW DIFFICULT HAVE THESE PROBLEMS MADE IT FOR YOU TO DO YOUR WORK, TAKE CARE OF THINGS AT HOME, OR GET ALONG WITH OTHER PEOPLE: NOT DIFFICULT AT ALL
3. TROUBLE FALLING OR STAYING ASLEEP: NOT AT ALL
5. POOR APPETITE OR OVEREATING: NOT AT ALL
9. THOUGHTS THAT YOU WOULD BE BETTER OFF DEAD, OR OF HURTING YOURSELF: NOT AT ALL
4. FEELING TIRED OR HAVING LITTLE ENERGY: NOT AT ALL
SUM OF ALL RESPONSES TO PHQ QUESTIONS 1-9: 1

## 2024-08-02 NOTE — PROGRESS NOTES
decreased urine volume, difficulty urinating, dysuria, flank pain, frequency, hematuria and urgency.   Musculoskeletal:  Negative for arthralgias, back pain, gait problem, joint swelling, myalgias, neck pain and neck stiffness.   Skin:  Negative for color change, pallor and rash.   Allergic/Immunologic: Negative for environmental allergies and food allergies.   Neurological:  Negative for dizziness, tremors, syncope, weakness, light-headedness, numbness and headaches.   Hematological:  Negative for adenopathy. Does not bruise/bleed easily.   Psychiatric/Behavioral:  Positive for decreased concentration and dysphoric mood. Negative for agitation, behavioral problems, confusion, hallucinations, self-injury, sleep disturbance and suicidal ideas. The patient is nervous/anxious. The patient is not hyperactive.        /77 (Position: Sitting, Cuff Size: Medium Adult)   Pulse 90   Ht 1.549 m (5' 1\")   Wt 92.2 kg (203 lb 3.2 oz)   LMP 07/26/2024   SpO2 98%   BMI 38.39 kg/m²   Physical Exam  Vitals and nursing note reviewed.   Constitutional:       General: She is not in acute distress.     Appearance: Normal appearance. She is well-developed. She is not ill-appearing, toxic-appearing or diaphoretic.   HENT:      Head: Normocephalic and atraumatic.      Right Ear: Tympanic membrane, ear canal and external ear normal. There is no impacted cerumen.      Left Ear: Tympanic membrane, ear canal and external ear normal. There is no impacted cerumen.      Nose: Nose normal. No congestion or rhinorrhea.      Mouth/Throat:      Mouth: Mucous membranes are moist.      Pharynx: Oropharynx is clear. No oropharyngeal exudate or posterior oropharyngeal erythema.   Eyes:      General: No scleral icterus.        Right eye: No discharge.         Left eye: No discharge.      Extraocular Movements: Extraocular movements intact.      Conjunctiva/sclera: Conjunctivae normal.      Pupils: Pupils are equal, round, and reactive to light.

## 2024-08-06 DIAGNOSIS — Z79.899 HIGH RISK MEDICATION USE: ICD-10-CM

## 2024-08-06 LAB
25(OH)D3 SERPL-MCNC: 31.1 NG/ML
ALBUMIN SERPL-MCNC: 4.4 G/DL (ref 3.5–5.2)
ALP SERPL-CCNC: 91 U/L (ref 35–104)
ALT SERPL-CCNC: 20 U/L (ref 5–33)
ANION GAP SERPL CALCULATED.3IONS-SCNC: 10 MMOL/L (ref 7–19)
AST SERPL-CCNC: 18 U/L (ref 5–32)
BASOPHILS # BLD: 0 K/UL (ref 0–0.2)
BASOPHILS NFR BLD: 0.8 % (ref 0–1)
BILIRUB SERPL-MCNC: 0.4 MG/DL (ref 0.2–1.2)
BUN SERPL-MCNC: 10 MG/DL (ref 6–20)
CALCIUM SERPL-MCNC: 9.1 MG/DL (ref 8.6–10)
CHLORIDE SERPL-SCNC: 102 MMOL/L (ref 98–111)
CHOLEST SERPL-MCNC: 213 MG/DL (ref 160–199)
CO2 SERPL-SCNC: 26 MMOL/L (ref 22–29)
CREAT SERPL-MCNC: 0.7 MG/DL (ref 0.5–0.9)
EOSINOPHIL # BLD: 0.1 K/UL (ref 0–0.6)
EOSINOPHIL NFR BLD: 1.4 % (ref 0–5)
ERYTHROCYTE [DISTWIDTH] IN BLOOD BY AUTOMATED COUNT: 12.5 % (ref 11.5–14.5)
GLUCOSE SERPL-MCNC: 87 MG/DL (ref 74–109)
HBA1C MFR BLD: 4.9 % (ref 4–6)
HCT VFR BLD AUTO: 39.4 % (ref 37–47)
HDLC SERPL-MCNC: 33 MG/DL (ref 65–121)
HGB BLD-MCNC: 13.2 G/DL (ref 12–16)
IMM GRANULOCYTES # BLD: 0 K/UL
LDLC SERPL CALC-MCNC: 151 MG/DL
LYMPHOCYTES # BLD: 2.2 K/UL (ref 1.1–4.5)
LYMPHOCYTES NFR BLD: 43 % (ref 20–40)
MCH RBC QN AUTO: 28.2 PG (ref 27–31)
MCHC RBC AUTO-ENTMCNC: 33.5 G/DL (ref 33–37)
MCV RBC AUTO: 84.2 FL (ref 81–99)
MONOCYTES # BLD: 0.4 K/UL (ref 0–0.9)
MONOCYTES NFR BLD: 8.6 % (ref 0–10)
NEUTROPHILS # BLD: 2.4 K/UL (ref 1.5–7.5)
NEUTS SEG NFR BLD: 46 % (ref 50–65)
PLATELET # BLD AUTO: 291 K/UL (ref 130–400)
PMV BLD AUTO: 9.6 FL (ref 9.4–12.3)
POTASSIUM SERPL-SCNC: 4.1 MMOL/L (ref 3.5–5)
PROT SERPL-MCNC: 7.3 G/DL (ref 6.6–8.7)
RBC # BLD AUTO: 4.68 M/UL (ref 4.2–5.4)
SODIUM SERPL-SCNC: 138 MMOL/L (ref 136–145)
TRIGL SERPL-MCNC: 143 MG/DL (ref 0–149)
TSH SERPL DL<=0.005 MIU/L-ACNC: 0.89 UIU/ML (ref 0.27–4.2)
WBC # BLD AUTO: 5.1 K/UL (ref 4.8–10.8)

## 2024-08-07 ENCOUNTER — OFFICE VISIT (OUTPATIENT)
Dept: OBGYN CLINIC | Age: 24
End: 2024-08-07
Payer: COMMERCIAL

## 2024-08-07 VITALS
BODY MASS INDEX: 38.17 KG/M2 | SYSTOLIC BLOOD PRESSURE: 126 MMHG | HEART RATE: 92 BPM | WEIGHT: 202 LBS | DIASTOLIC BLOOD PRESSURE: 85 MMHG

## 2024-08-07 DIAGNOSIS — Z12.39 ENCOUNTER FOR SCREENING BREAST EXAMINATION: ICD-10-CM

## 2024-08-07 DIAGNOSIS — Z30.431 ENCOUNTER FOR ROUTINE CHECKING OF INTRAUTERINE CONTRACEPTIVE DEVICE (IUD): ICD-10-CM

## 2024-08-07 DIAGNOSIS — Z01.419 WELL WOMAN EXAM WITH ROUTINE GYNECOLOGICAL EXAM: Primary | ICD-10-CM

## 2024-08-07 PROCEDURE — 99395 PREV VISIT EST AGE 18-39: CPT | Performed by: NURSE PRACTITIONER

## 2024-08-07 NOTE — PROGRESS NOTES
Pt presents today for pap smear and breast exam.      Last mammogram:  never  Last pap smear:  2023  Contraception:  IUD  :  1  Para:  1  AB:  0  Last bone density:  never  Last colonoscopy:   never

## 2024-08-07 NOTE — PROGRESS NOTES
[facility]  CN Office Note    Valentine Hicks is a 23 y.o. female who presents today for her medical conditions/ complaints as noted below.  Chief Complaint   Patient presents with    Annual Exam     Valentine presents to the office for her yearly exam, pt is doing well. Pt is using the Mirena as her means of birth control.  Pt does have a monthly cycle. Pt states her mood is good.     Patient Active Problem List   Diagnosis    Anxiety    Non-recurrent acute serous otitis media of both ears    Biological false positive RPR test    Family history of congenital heart defect    Obesity affecting pregnancy, antepartum    Anxiety disorder affecting pregnancy, antepartum    38 weeks gestation of pregnancy    Born by  section    Postpartum depression    Post partum depression       Patient's last menstrual period was 2024.      Past Medical History:   Diagnosis Date    ADHD (attention deficit hyperactivity disorder)     Anxiety 2019    Obesity affecting pregnancy, antepartum 2/15/2024    Postpartum depression 2024     Past Surgical History:   Procedure Laterality Date     SECTION N/A 2024     SECTION performed by Rogelio Amin MD at [facility] L&D [facility]     SECTION  2024     Family History   Problem Relation Age of Onset    High Cholesterol Mother          in MVA    Hypothyroidism Mother     Other Mother         questionable tumor    Obesity Father         Most of my family Members are obese    Vision Loss Brother          in MVA at age 13    Autism Spectrum Disorder Brother     No Known Problems Maternal Grandmother     No Known Problems Maternal Grandfather     Arthritis Paternal Grandmother     Dementia Paternal Grandmother     Arthritis Maternal Aunt     Rheum Arthritis Paternal Aunt     Obesity Other         several family members.     Social History     Tobacco Use    Smoking status: Never    Smokeless tobacco: Never

## 2024-08-07 NOTE — PATIENT INSTRUCTIONS
physician office visit. These surveys are confidential and no health information about you is shared. We are eager to improve for you and we are counting on your feedback to help make that happen.

## 2024-09-03 DIAGNOSIS — F98.8 ATTENTION DEFICIT DISORDER, UNSPECIFIED HYPERACTIVITY PRESENCE: ICD-10-CM

## 2024-09-03 RX ORDER — DEXTROAMPHETAMINE SACCHARATE, AMPHETAMINE ASPARTATE MONOHYDRATE, DEXTROAMPHETAMINE SULFATE AND AMPHETAMINE SULFATE 1.25; 1.25; 1.25; 1.25 MG/1; MG/1; MG/1; MG/1
5 CAPSULE, EXTENDED RELEASE ORAL DAILY
Qty: 30 CAPSULE | Refills: 0 | Status: SHIPPED | OUTPATIENT
Start: 2024-09-03 | End: 2024-10-04

## 2024-09-03 NOTE — TELEPHONE ENCOUNTER
Valentine Hicks called to request a refill on her medication.      Last office visit : 8/2/2024   Next office visit : 9/13/2024     Last UDS:   Benzodiazepine Screen, Urine   Date Value Ref Range Status   08/02/2024 NEG  Final     Buprenorphine Urine   Date Value Ref Range Status   08/02/2024 NEG  Final     Cocaine Metabolite Screen, Urine   Date Value Ref Range Status   08/02/2024 NEG  Final     Gabapentin Screen, Urine   Date Value Ref Range Status   08/02/2024 NA  Final     Oxycodone Screen, Ur   Date Value Ref Range Status   08/02/2024 NEG  Final     Propoxyphene Screen, Urine   Date Value Ref Range Status   08/02/2024 NEG  Final     THC Screen, Urine   Date Value Ref Range Status   08/02/2024 NEG  Final     Tricyclic Antidepressants, Urine   Date Value Ref Range Status   08/02/2024 NEG  Final   04/23/2024 Negative Negative <300 ng/mL Final       Last Ronald: 08/02/2024  Medication Contract: 08/02/2024     Requested Prescriptions     Pending Prescriptions Disp Refills    amphetamine-dextroamphetamine (ADDERALL XR) 5 MG extended release capsule [Pharmacy Med Name: dextroamphetamine-amphetamine ER 5 mg 24hr capsule,extend release] 30 capsule 0     Sig: Take 1 capsule by mouth daily for 31 days. Max Daily Amount: 5 mg         Please approve or refuse this medication.   Arabella Chaves LPN

## 2024-09-13 ENCOUNTER — OFFICE VISIT (OUTPATIENT)
Dept: INTERNAL MEDICINE | Age: 24
End: 2024-09-13

## 2024-09-13 VITALS
WEIGHT: 199 LBS | HEIGHT: 61 IN | SYSTOLIC BLOOD PRESSURE: 122 MMHG | BODY MASS INDEX: 37.57 KG/M2 | OXYGEN SATURATION: 97 % | DIASTOLIC BLOOD PRESSURE: 75 MMHG | HEART RATE: 97 BPM

## 2024-09-13 DIAGNOSIS — F32.89 OTHER DEPRESSION: Primary | ICD-10-CM

## 2024-09-13 DIAGNOSIS — F41.9 ANXIETY: ICD-10-CM

## 2024-09-13 DIAGNOSIS — Z23 NEEDS FLU SHOT: ICD-10-CM

## 2024-09-13 DIAGNOSIS — E78.5 HYPERLIPIDEMIA, UNSPECIFIED HYPERLIPIDEMIA TYPE: ICD-10-CM

## 2024-09-13 DIAGNOSIS — F98.8 ATTENTION DEFICIT DISORDER, UNSPECIFIED HYPERACTIVITY PRESENCE: ICD-10-CM

## 2024-09-13 RX ORDER — CITALOPRAM HYDROBROMIDE 10 MG/1
10 TABLET ORAL DAILY
Qty: 90 TABLET | Refills: 1 | Status: SHIPPED | OUTPATIENT
Start: 2024-09-13

## 2024-09-15 SDOH — ECONOMIC STABILITY: INCOME INSECURITY: HOW HARD IS IT FOR YOU TO PAY FOR THE VERY BASICS LIKE FOOD, HOUSING, MEDICAL CARE, AND HEATING?: NOT HARD AT ALL

## 2024-09-15 SDOH — ECONOMIC STABILITY: FOOD INSECURITY: WITHIN THE PAST 12 MONTHS, YOU WORRIED THAT YOUR FOOD WOULD RUN OUT BEFORE YOU GOT MONEY TO BUY MORE.: NEVER TRUE

## 2024-09-15 SDOH — ECONOMIC STABILITY: FOOD INSECURITY: WITHIN THE PAST 12 MONTHS, THE FOOD YOU BOUGHT JUST DIDN'T LAST AND YOU DIDN'T HAVE MONEY TO GET MORE.: NEVER TRUE

## 2024-09-15 ASSESSMENT — PATIENT HEALTH QUESTIONNAIRE - PHQ9
4. FEELING TIRED OR HAVING LITTLE ENERGY: NOT AT ALL
SUM OF ALL RESPONSES TO PHQ QUESTIONS 1-9: 0
SUM OF ALL RESPONSES TO PHQ QUESTIONS 1-9: 0
5. POOR APPETITE OR OVEREATING: NOT AT ALL
6. FEELING BAD ABOUT YOURSELF - OR THAT YOU ARE A FAILURE OR HAVE LET YOURSELF OR YOUR FAMILY DOWN: NOT AT ALL
9. THOUGHTS THAT YOU WOULD BE BETTER OFF DEAD, OR OF HURTING YOURSELF: NOT AT ALL
3. TROUBLE FALLING OR STAYING ASLEEP: NOT AT ALL
SUM OF ALL RESPONSES TO PHQ QUESTIONS 1-9: 0
7. TROUBLE CONCENTRATING ON THINGS, SUCH AS READING THE NEWSPAPER OR WATCHING TELEVISION: NOT AT ALL
SUM OF ALL RESPONSES TO PHQ9 QUESTIONS 1 & 2: 0
SUM OF ALL RESPONSES TO PHQ QUESTIONS 1-9: 0
10. IF YOU CHECKED OFF ANY PROBLEMS, HOW DIFFICULT HAVE THESE PROBLEMS MADE IT FOR YOU TO DO YOUR WORK, TAKE CARE OF THINGS AT HOME, OR GET ALONG WITH OTHER PEOPLE: NOT DIFFICULT AT ALL
1. LITTLE INTEREST OR PLEASURE IN DOING THINGS: NOT AT ALL
2. FEELING DOWN, DEPRESSED OR HOPELESS: NOT AT ALL
8. MOVING OR SPEAKING SO SLOWLY THAT OTHER PEOPLE COULD HAVE NOTICED. OR THE OPPOSITE, BEING SO FIGETY OR RESTLESS THAT YOU HAVE BEEN MOVING AROUND A LOT MORE THAN USUAL: NOT AT ALL

## 2024-09-21 ENCOUNTER — TELEPHONE (OUTPATIENT)
Dept: INTERNAL MEDICINE | Age: 24
End: 2024-09-21

## 2024-09-23 DIAGNOSIS — F98.8 ATTENTION DEFICIT DISORDER, UNSPECIFIED HYPERACTIVITY PRESENCE: ICD-10-CM

## 2024-09-23 RX ORDER — DEXTROAMPHETAMINE SACCHARATE, AMPHETAMINE ASPARTATE MONOHYDRATE, DEXTROAMPHETAMINE SULFATE AND AMPHETAMINE SULFATE 1.25; 1.25; 1.25; 1.25 MG/1; MG/1; MG/1; MG/1
5 CAPSULE, EXTENDED RELEASE ORAL DAILY
Qty: 30 CAPSULE | Refills: 0 | Status: SHIPPED | OUTPATIENT
Start: 2024-10-05 | End: 2024-11-05

## 2024-11-12 DIAGNOSIS — F98.8 ATTENTION DEFICIT DISORDER: ICD-10-CM

## 2024-11-12 RX ORDER — DEXTROAMPHETAMINE SACCHARATE, AMPHETAMINE ASPARTATE MONOHYDRATE, DEXTROAMPHETAMINE SULFATE AND AMPHETAMINE SULFATE 1.25; 1.25; 1.25; 1.25 MG/1; MG/1; MG/1; MG/1
5 CAPSULE, EXTENDED RELEASE ORAL DAILY
Qty: 30 CAPSULE | Refills: 0 | Status: SHIPPED | OUTPATIENT
Start: 2024-11-12 | End: 2024-12-13

## 2024-11-12 NOTE — TELEPHONE ENCOUNTER
Valentine Hicks called to request a refill on her medication.      Last office visit : 9/13/2024   Next office visit : 12/16/2024     Last UDS:   Benzodiazepine Screen, Urine   Date Value Ref Range Status   08/02/2024 NEG  Final     Buprenorphine Urine   Date Value Ref Range Status   08/02/2024 NEG  Final     Cocaine Metabolite Screen, Urine   Date Value Ref Range Status   08/02/2024 NEG  Final     Gabapentin Screen, Urine   Date Value Ref Range Status   08/02/2024 NA  Final     Oxycodone Screen, Ur   Date Value Ref Range Status   08/02/2024 NEG  Final     Propoxyphene Screen, Urine   Date Value Ref Range Status   08/02/2024 NEG  Final     THC Screen, Urine   Date Value Ref Range Status   08/02/2024 NEG  Final     Tricyclic Antidepressants, Urine   Date Value Ref Range Status   08/02/2024 NEG  Final   04/23/2024 Negative Negative <300 ng/mL Final       Last Ronald: 09/03/2024  Medication Contract: 08/02/2024     Requested Prescriptions     Pending Prescriptions Disp Refills    amphetamine-dextroamphetamine (ADDERALL XR) 5 MG extended release capsule [Pharmacy Med Name: dextroamphetamine-amphetamine ER 5 mg 24hr capsule,extend release] 30 capsule 0     Sig: Take 1 capsule by mouth daily for 31 days. Max Daily Amount: 5 mg         Please approve or refuse this medication.   Arabella Chaves LPN

## 2024-11-21 ENCOUNTER — HOSPITAL ENCOUNTER (EMERGENCY)
Age: 24
Discharge: HOME | End: 2024-11-21
Attending: EMERGENCY MEDICINE
Payer: COMMERCIAL

## 2024-11-21 VITALS
HEIGHT: 61 IN | BODY MASS INDEX: 37.6 KG/M2 | RESPIRATION RATE: 18 BRPM | SYSTOLIC BLOOD PRESSURE: 115 MMHG | OXYGEN SATURATION: 99 % | DIASTOLIC BLOOD PRESSURE: 72 MMHG | HEART RATE: 104 BPM | TEMPERATURE: 99.7 F

## 2024-11-21 DIAGNOSIS — R11.2 NAUSEA AND VOMITING, UNSPECIFIED VOMITING TYPE: Primary | ICD-10-CM

## 2024-11-21 DIAGNOSIS — R55 VASOVAGAL SYNCOPE: ICD-10-CM

## 2024-11-21 LAB
ALBUMIN SERPL-MCNC: 4.3 G/DL (ref 3.5–5.2)
ALP SERPL-CCNC: 85 U/L (ref 35–104)
ALT SERPL-CCNC: 14 U/L (ref 5–33)
ANION GAP SERPL CALCULATED.3IONS-SCNC: 9 MMOL/L (ref 7–19)
AST SERPL-CCNC: 15 U/L (ref 5–32)
BACTERIA URNS QL MICRO: ABNORMAL /HPF
BASOPHILS # BLD: 0 K/UL (ref 0–0.2)
BASOPHILS NFR BLD: 0.2 % (ref 0–1)
BILIRUB SERPL-MCNC: 0.5 MG/DL (ref 0.2–1.2)
BILIRUB UR QL STRIP: NEGATIVE
BUN SERPL-MCNC: 14 MG/DL (ref 6–20)
CALCIUM SERPL-MCNC: 8.9 MG/DL (ref 8.6–10)
CHLORIDE SERPL-SCNC: 99 MMOL/L (ref 98–111)
CLARITY UR: ABNORMAL
CO2 SERPL-SCNC: 27 MMOL/L (ref 22–29)
COLOR UR: YELLOW
CREAT SERPL-MCNC: 0.6 MG/DL (ref 0.5–0.9)
CRYSTALS URNS MICRO: ABNORMAL /HPF
EOSINOPHIL # BLD: 0 K/UL (ref 0–0.6)
EOSINOPHIL NFR BLD: 0.1 % (ref 0–5)
EPI CELLS #/AREA URNS AUTO: 8 /HPF (ref 0–5)
ERYTHROCYTE [DISTWIDTH] IN BLOOD BY AUTOMATED COUNT: 12.7 % (ref 11.5–14.5)
GLUCOSE SERPL-MCNC: 108 MG/DL (ref 70–99)
GLUCOSE UR STRIP.AUTO-MCNC: NEGATIVE MG/DL
HCG SERPL QL: NEGATIVE
HCT VFR BLD AUTO: 43.6 % (ref 37–47)
HGB BLD-MCNC: 14 G/DL (ref 12–16)
HGB UR STRIP.AUTO-MCNC: NEGATIVE MG/L
HYALINE CASTS #/AREA URNS AUTO: 18 /HPF (ref 0–8)
IMM GRANULOCYTES # BLD: 0 K/UL
KETONES UR STRIP.AUTO-MCNC: 15 MG/DL
LEUKOCYTE ESTERASE UR QL STRIP.AUTO: ABNORMAL
LIPASE SERPL-CCNC: 20 U/L (ref 13–60)
LYMPHOCYTES # BLD: 0.7 K/UL (ref 1.1–4.5)
LYMPHOCYTES NFR BLD: 6.4 % (ref 20–40)
MAGNESIUM SERPL-MCNC: 1.8 MG/DL (ref 1.6–2.6)
MCH RBC QN AUTO: 27.2 PG (ref 27–31)
MCHC RBC AUTO-ENTMCNC: 32.1 G/DL (ref 33–37)
MCV RBC AUTO: 84.8 FL (ref 81–99)
MONOCYTES # BLD: 0.5 K/UL (ref 0–0.9)
MONOCYTES NFR BLD: 4.1 % (ref 0–10)
NEUTROPHILS # BLD: 9.8 K/UL (ref 1.5–7.5)
NEUTS SEG NFR BLD: 88.9 % (ref 50–65)
NITRITE UR QL STRIP.AUTO: NEGATIVE
PH UR STRIP.AUTO: 7.5 [PH] (ref 5–8)
PLATELET # BLD AUTO: 300 K/UL (ref 130–400)
PMV BLD AUTO: 9.9 FL (ref 9.4–12.3)
POTASSIUM SERPL-SCNC: 3.7 MMOL/L (ref 3.5–5)
PROT SERPL-MCNC: 7.3 G/DL (ref 6.4–8.3)
PROT UR STRIP.AUTO-MCNC: 30 MG/DL
RBC # BLD AUTO: 5.14 M/UL (ref 4.2–5.4)
RBC #/AREA URNS AUTO: 4 /HPF (ref 0–4)
SODIUM SERPL-SCNC: 135 MMOL/L (ref 136–145)
SP GR UR STRIP.AUTO: 1.03 (ref 1–1.03)
UROBILINOGEN UR STRIP.AUTO-MCNC: 1 E.U./DL
WBC # BLD AUTO: 11 K/UL (ref 4.8–10.8)
WBC #/AREA URNS AUTO: 8 /HPF (ref 0–5)

## 2024-11-21 PROCEDURE — 36415 COLL VENOUS BLD VENIPUNCTURE: CPT

## 2024-11-21 PROCEDURE — 83690 ASSAY OF LIPASE: CPT

## 2024-11-21 PROCEDURE — 84703 CHORIONIC GONADOTROPIN ASSAY: CPT

## 2024-11-21 PROCEDURE — 96374 THER/PROPH/DIAG INJ IV PUSH: CPT

## 2024-11-21 PROCEDURE — 83735 ASSAY OF MAGNESIUM: CPT

## 2024-11-21 PROCEDURE — 6360000002 HC RX W HCPCS: Performed by: EMERGENCY MEDICINE

## 2024-11-21 PROCEDURE — 85025 COMPLETE CBC W/AUTO DIFF WBC: CPT

## 2024-11-21 PROCEDURE — 80053 COMPREHEN METABOLIC PANEL: CPT

## 2024-11-21 PROCEDURE — 99284 EMERGENCY DEPT VISIT MOD MDM: CPT

## 2024-11-21 RX ORDER — ONDANSETRON 4 MG/1
4 TABLET, ORALLY DISINTEGRATING ORAL 3 TIMES DAILY PRN
Qty: 21 TABLET | Refills: 0 | Status: SHIPPED | OUTPATIENT
Start: 2024-11-21

## 2024-11-21 RX ORDER — PROMETHAZINE HYDROCHLORIDE 12.5 MG/1
12.5 TABLET ORAL 3 TIMES DAILY PRN
Qty: 12 TABLET | Refills: 0 | Status: SHIPPED | OUTPATIENT
Start: 2024-11-21 | End: 2024-11-28

## 2024-11-21 RX ORDER — ONDANSETRON 2 MG/ML
4 INJECTION INTRAMUSCULAR; INTRAVENOUS ONCE
Status: COMPLETED | OUTPATIENT
Start: 2024-11-21 | End: 2024-11-21

## 2024-11-21 RX ADMIN — ONDANSETRON 4 MG: 2 INJECTION INTRAMUSCULAR; INTRAVENOUS at 23:25

## 2024-11-21 ASSESSMENT — ENCOUNTER SYMPTOMS
NAUSEA: 1
EYES NEGATIVE: 1
VOMITING: 1
RESPIRATORY NEGATIVE: 1

## 2024-11-22 NOTE — ED PROVIDER NOTES
precautions for worsening of the condition or development of new concerning symptoms.  Patient was encouraged to follow-up with their primary care doctor in the appropriate timeframe.  Necessary prescriptions and information have been provided for treatment at home.  Patient voices understanding and agreement with the plan.'      CONSULTS:  None        FINAL IMPRESSION     1. Nausea and vomiting, unspecified vomiting type    2. Vasovagal syncope          DISPOSITION/PLAN   DISPOSITION Decision To Discharge 11/21/2024 11:31:22 PM           No notes of EC Admission Criteria type on file.    PATIENT REFERRED TO:  [facility] EMERGENCY DEPT  [address] [facility] [address] [address] [phone]    If symptoms worsen    Iris Kelley MD  [address] [place] [address] [address]  [facility] [id number] [phone]      As needed      DISCHARGE MEDICATIONS:  Discharge Medication List as of 11/21/2024 11:45 PM        START taking these medications    Details   ondansetron (ZOFRAN-ODT) 4 MG disintegrating tablet Take 1 tablet by mouth 3 times daily as needed for Nausea or Vomiting, Disp-21 tablet, R-0Normal      promethazine (PHENERGAN) 12.5 MG tablet Take 1 tablet by mouth 3 times daily as needed for Nausea, Disp-12 tablet, R-0Normal                (Please note that portions of this note were completed with a voice recognition program.  Efforts were made to edit the dictations butoccasionally words are mis-transcribed.)    Rich Palmer Jr, MD (electronically signed)  AttendingEmergency Physician          Rich Palmer Jr., MD  11/22/24 0418

## 2024-12-16 ENCOUNTER — TELEPHONE (OUTPATIENT)
Dept: OBGYN CLINIC | Age: 24
End: 2024-12-16

## 2024-12-16 NOTE — TELEPHONE ENCOUNTER
Patient was wanting to schedule well woman visit. Told patient she will not be due until after 8/7/2025

## 2025-01-09 DIAGNOSIS — F98.8 ATTENTION DEFICIT DISORDER: ICD-10-CM

## 2025-01-09 RX ORDER — DEXTROAMPHETAMINE SACCHARATE, AMPHETAMINE ASPARTATE MONOHYDRATE, DEXTROAMPHETAMINE SULFATE AND AMPHETAMINE SULFATE 1.25; 1.25; 1.25; 1.25 MG/1; MG/1; MG/1; MG/1
1 CAPSULE, EXTENDED RELEASE ORAL DAILY
Qty: 30 CAPSULE | Refills: 0 | Status: SHIPPED | OUTPATIENT
Start: 2025-01-09 | End: 2025-02-08

## 2025-01-09 NOTE — TELEPHONE ENCOUNTER
Valentine Hicks called to request a refill on her medication.      Last office visit : 9/13/2024   Next office visit : 1/13/2025     Last UDS:   Benzodiazepine Screen, Urine   Date Value Ref Range Status   08/02/2024 NEG  Final     Buprenorphine Urine   Date Value Ref Range Status   08/02/2024 NEG  Final     Cocaine Metabolite Screen, Urine   Date Value Ref Range Status   08/02/2024 NEG  Final     Gabapentin Screen, Urine   Date Value Ref Range Status   08/02/2024 NA  Final     Oxycodone Screen, Ur   Date Value Ref Range Status   08/02/2024 NEG  Final     Propoxyphene Screen, Urine   Date Value Ref Range Status   08/02/2024 NEG  Final     THC Screen, Urine   Date Value Ref Range Status   08/02/2024 NEG  Final     Tricyclic Antidepressants, Urine   Date Value Ref Range Status   08/02/2024 NEG  Final   04/23/2024 Negative Negative <300 ng/mL Final       Last Ronald: 11/12/2024  Medication Contract: 08/02/2024     Requested Prescriptions     Pending Prescriptions Disp Refills    amphetamine-dextroamphetamine (ADDERALL XR) 5 MG extended release capsule [Pharmacy Med Name: dextroamphetamine-amphetamine ER 5 mg 24hr capsule,extend release] 30 capsule 0     Sig: Take 1 capsule by mouth daily.         Please approve or refuse this medication.   Arabella Chaves LPN

## 2025-01-13 ENCOUNTER — OFFICE VISIT (OUTPATIENT)
Dept: INTERNAL MEDICINE | Age: 25
End: 2025-01-13
Payer: MEDICAID

## 2025-01-13 VITALS
SYSTOLIC BLOOD PRESSURE: 117 MMHG | BODY MASS INDEX: 37 KG/M2 | HEART RATE: 114 BPM | OXYGEN SATURATION: 98 % | WEIGHT: 196 LBS | DIASTOLIC BLOOD PRESSURE: 70 MMHG | HEIGHT: 61 IN

## 2025-01-13 DIAGNOSIS — F32.89 OTHER DEPRESSION: ICD-10-CM

## 2025-01-13 DIAGNOSIS — E78.5 HYPERLIPIDEMIA, UNSPECIFIED HYPERLIPIDEMIA TYPE: ICD-10-CM

## 2025-01-13 DIAGNOSIS — F98.8 ATTENTION DEFICIT DISORDER, UNSPECIFIED TYPE: Primary | ICD-10-CM

## 2025-01-13 PROCEDURE — G8417 CALC BMI ABV UP PARAM F/U: HCPCS | Performed by: INTERNAL MEDICINE

## 2025-01-13 PROCEDURE — G8427 DOCREV CUR MEDS BY ELIG CLIN: HCPCS | Performed by: INTERNAL MEDICINE

## 2025-01-13 PROCEDURE — 1036F TOBACCO NON-USER: CPT | Performed by: INTERNAL MEDICINE

## 2025-01-13 PROCEDURE — 99213 OFFICE O/P EST LOW 20 MIN: CPT | Performed by: INTERNAL MEDICINE

## 2025-01-13 SDOH — ECONOMIC STABILITY: FOOD INSECURITY: WITHIN THE PAST 12 MONTHS, THE FOOD YOU BOUGHT JUST DIDN'T LAST AND YOU DIDN'T HAVE MONEY TO GET MORE.: NEVER TRUE

## 2025-01-13 SDOH — ECONOMIC STABILITY: FOOD INSECURITY: WITHIN THE PAST 12 MONTHS, YOU WORRIED THAT YOUR FOOD WOULD RUN OUT BEFORE YOU GOT MONEY TO BUY MORE.: NEVER TRUE

## 2025-01-13 ASSESSMENT — PATIENT HEALTH QUESTIONNAIRE - PHQ9
2. FEELING DOWN, DEPRESSED OR HOPELESS: NOT AT ALL
5. POOR APPETITE OR OVEREATING: NOT AT ALL
9. THOUGHTS THAT YOU WOULD BE BETTER OFF DEAD, OR OF HURTING YOURSELF: NOT AT ALL
6. FEELING BAD ABOUT YOURSELF - OR THAT YOU ARE A FAILURE OR HAVE LET YOURSELF OR YOUR FAMILY DOWN: NOT AT ALL
SUM OF ALL RESPONSES TO PHQ QUESTIONS 1-9: 0
4. FEELING TIRED OR HAVING LITTLE ENERGY: NOT AT ALL
7. TROUBLE CONCENTRATING ON THINGS, SUCH AS READING THE NEWSPAPER OR WATCHING TELEVISION: NOT AT ALL
SUM OF ALL RESPONSES TO PHQ QUESTIONS 1-9: 0
3. TROUBLE FALLING OR STAYING ASLEEP: NOT AT ALL
SUM OF ALL RESPONSES TO PHQ QUESTIONS 1-9: 0
10. IF YOU CHECKED OFF ANY PROBLEMS, HOW DIFFICULT HAVE THESE PROBLEMS MADE IT FOR YOU TO DO YOUR WORK, TAKE CARE OF THINGS AT HOME, OR GET ALONG WITH OTHER PEOPLE: NOT DIFFICULT AT ALL
8. MOVING OR SPEAKING SO SLOWLY THAT OTHER PEOPLE COULD HAVE NOTICED. OR THE OPPOSITE, BEING SO FIGETY OR RESTLESS THAT YOU HAVE BEEN MOVING AROUND A LOT MORE THAN USUAL: NOT AT ALL
SUM OF ALL RESPONSES TO PHQ QUESTIONS 1-9: 0

## 2025-01-13 NOTE — PROGRESS NOTES
scleral icterus.        Right eye: No discharge.         Left eye: No discharge.      Extraocular Movements: Extraocular movements intact.      Conjunctiva/sclera: Conjunctivae normal.      Pupils: Pupils are equal, round, and reactive to light.   Neck:      Thyroid: No thyromegaly.      Vascular: No carotid bruit or JVD.      Trachea: No tracheal deviation.   Cardiovascular:      Rate and Rhythm: Normal rate and regular rhythm.      Pulses: Normal pulses.      Heart sounds: Normal heart sounds. No murmur heard.     No friction rub. No gallop.   Pulmonary:      Effort: Pulmonary effort is normal. No respiratory distress.      Breath sounds: Normal breath sounds. No stridor. No wheezing, rhonchi or rales.   Chest:      Chest wall: No tenderness.   Abdominal:      General: Bowel sounds are normal. There is no distension.      Palpations: Abdomen is soft. There is no mass.      Tenderness: There is no abdominal tenderness. There is no right CVA tenderness, left CVA tenderness, guarding or rebound.      Hernia: No hernia is present.   Musculoskeletal:         General: No swelling, tenderness, deformity or signs of injury. Normal range of motion.      Cervical back: Normal range of motion and neck supple. No rigidity. No muscular tenderness.      Right lower leg: No edema.      Left lower leg: No edema.   Lymphadenopathy:      Cervical: No cervical adenopathy.   Skin:     General: Skin is warm and dry.      Coloration: Skin is not jaundiced or pale.      Findings: No bruising, erythema, lesion or rash.   Neurological:      General: No focal deficit present.      Mental Status: She is alert and oriented to person, place, and time. Mental status is at baseline.      Cranial Nerves: No cranial nerve deficit.      Motor: No weakness or abnormal muscle tone.      Coordination: Coordination normal.      Gait: Gait normal.      Deep Tendon Reflexes: Reflexes are normal and symmetric. Reflexes normal.   Psychiatric:         Mood

## 2025-01-14 ASSESSMENT — ENCOUNTER SYMPTOMS
BLOOD IN STOOL: 0
SINUS PRESSURE: 0
EYE PAIN: 0
FACIAL SWELLING: 0
CONSTIPATION: 0
COLOR CHANGE: 0
SORE THROAT: 0
BACK PAIN: 0
EYE DISCHARGE: 0
VOMITING: 0
ABDOMINAL DISTENTION: 0
SHORTNESS OF BREATH: 0
CHEST TIGHTNESS: 0
VOICE CHANGE: 0
RECTAL PAIN: 0
EYE ITCHING: 0
WHEEZING: 0
COUGH: 0
PHOTOPHOBIA: 0
NAUSEA: 0
TROUBLE SWALLOWING: 0
ABDOMINAL PAIN: 0
ANAL BLEEDING: 0
RHINORRHEA: 0
DIARRHEA: 0
EYE REDNESS: 0
CHOKING: 0

## 2025-01-21 DIAGNOSIS — E78.5 HYPERLIPIDEMIA, UNSPECIFIED HYPERLIPIDEMIA TYPE: ICD-10-CM

## 2025-01-21 LAB
CHOLEST SERPL-MCNC: 176 MG/DL (ref 0–199)
HDLC SERPL-MCNC: 44 MG/DL (ref 40–60)
LDLC SERPL CALC-MCNC: 117 MG/DL
TRIGL SERPL-MCNC: 75 MG/DL (ref 0–149)

## 2025-02-24 DIAGNOSIS — F98.8 ATTENTION DEFICIT DISORDER: ICD-10-CM

## 2025-02-24 RX ORDER — DEXTROAMPHETAMINE SACCHARATE, AMPHETAMINE ASPARTATE MONOHYDRATE, DEXTROAMPHETAMINE SULFATE AND AMPHETAMINE SULFATE 1.25; 1.25; 1.25; 1.25 MG/1; MG/1; MG/1; MG/1
1 CAPSULE, EXTENDED RELEASE ORAL DAILY
Qty: 30 CAPSULE | Refills: 0 | Status: SHIPPED | OUTPATIENT
Start: 2025-02-24 | End: 2025-03-26

## 2025-02-24 NOTE — TELEPHONE ENCOUNTER
Valentine Hicks called to request a refill on her medication.      Last office visit : 1/13/2025   Next office visit : 4/15/2025     Last UDS:   Benzodiazepine Screen, Urine   Date Value Ref Range Status   08/02/2024 NEG  Final     Buprenorphine Urine   Date Value Ref Range Status   08/02/2024 NEG  Final     Cocaine Metabolite Screen, Urine   Date Value Ref Range Status   08/02/2024 NEG  Final     Gabapentin Screen, Urine   Date Value Ref Range Status   08/02/2024 NA  Final     Oxycodone Screen, Ur   Date Value Ref Range Status   08/02/2024 NEG  Final     Propoxyphene Screen, Urine   Date Value Ref Range Status   08/02/2024 NEG  Final     THC Screen, Urine   Date Value Ref Range Status   08/02/2024 NEG  Final     Tricyclic Antidepressants, Urine   Date Value Ref Range Status   08/02/2024 NEG  Final   04/23/2024 Negative Negative <300 ng/mL Final       Last Ronald: 1/9/2025  Medication Contract: 8/2/2024     Requested Prescriptions     Pending Prescriptions Disp Refills    amphetamine-dextroamphetamine (ADDERALL XR) 5 MG extended release capsule [Pharmacy Med Name: dextroamphetamine-amphetamine ER 5 mg 24hr capsule,extend release] 30 capsule 0     Sig: Take 1 capsule by mouth daily for 30 days. Max Daily Amount: 5 mg         Please approve or refuse this medication.   Arabella Chaves LPN

## 2025-04-02 DIAGNOSIS — F98.8 ATTENTION DEFICIT DISORDER: ICD-10-CM

## 2025-04-02 RX ORDER — DEXTROAMPHETAMINE SACCHARATE, AMPHETAMINE ASPARTATE MONOHYDRATE, DEXTROAMPHETAMINE SULFATE AND AMPHETAMINE SULFATE 1.25; 1.25; 1.25; 1.25 MG/1; MG/1; MG/1; MG/1
1 CAPSULE, EXTENDED RELEASE ORAL DAILY
Qty: 30 CAPSULE | Refills: 0 | Status: SHIPPED | OUTPATIENT
Start: 2025-04-02 | End: 2025-05-02

## 2025-04-02 NOTE — TELEPHONE ENCOUNTER
Valentine Hicks called to request a refill on her medication.      Last office visit : 1/13/2025   Next office visit : 4/15/2025     Last UDS:   Benzodiazepine Screen, Urine   Date Value Ref Range Status   08/02/2024 NEG  Final     Buprenorphine Urine   Date Value Ref Range Status   08/02/2024 NEG  Final     Cocaine Metabolite Screen, Urine   Date Value Ref Range Status   08/02/2024 NEG  Final     Gabapentin Screen, Urine   Date Value Ref Range Status   08/02/2024 NA  Final     Oxycodone Screen, Ur   Date Value Ref Range Status   08/02/2024 NEG  Final     Propoxyphene Screen, Urine   Date Value Ref Range Status   08/02/2024 NEG  Final     THC Screen, Urine   Date Value Ref Range Status   08/02/2024 NEG  Final     Tricyclic Antidepressants, Urine   Date Value Ref Range Status   08/02/2024 NEG  Final   04/23/2024 Negative Negative <300 ng/mL Final       Last Ronald: 02/24/2025  Medication Contract: 08/2/2024     Requested Prescriptions     Pending Prescriptions Disp Refills    amphetamine-dextroamphetamine (ADDERALL XR) 5 MG extended release capsule [Pharmacy Med Name: dextroamphetamine-amphetamine ER 5 mg 24hr capsule,extend release] 30 capsule 0     Sig: Take 1 capsule by mouth daily for 30 days. Max Daily Amount: 5 mg         Please approve or refuse this medication.   Arabella Chaves LPN

## 2025-04-15 ENCOUNTER — OFFICE VISIT (OUTPATIENT)
Dept: INTERNAL MEDICINE | Age: 25
End: 2025-04-15
Payer: MEDICAID

## 2025-04-15 VITALS
HEART RATE: 77 BPM | HEIGHT: 61 IN | BODY MASS INDEX: 36.25 KG/M2 | SYSTOLIC BLOOD PRESSURE: 118 MMHG | OXYGEN SATURATION: 97 % | WEIGHT: 192 LBS | DIASTOLIC BLOOD PRESSURE: 78 MMHG | RESPIRATION RATE: 20 BRPM

## 2025-04-15 DIAGNOSIS — Z13.29 SCREENING FOR THYROID DISORDER: ICD-10-CM

## 2025-04-15 DIAGNOSIS — Z79.899 HIGH RISK MEDICATION USE: ICD-10-CM

## 2025-04-15 DIAGNOSIS — E66.812 CLASS 2 OBESITY WITH BODY MASS INDEX (BMI) OF 36.0 TO 36.9 IN ADULT, UNSPECIFIED OBESITY TYPE, UNSPECIFIED WHETHER SERIOUS COMORBIDITY PRESENT: ICD-10-CM

## 2025-04-15 DIAGNOSIS — F90.9 ATTENTION DEFICIT HYPERACTIVITY DISORDER (ADHD), UNSPECIFIED ADHD TYPE: Primary | ICD-10-CM

## 2025-04-15 DIAGNOSIS — F32.89 OTHER DEPRESSION: ICD-10-CM

## 2025-04-15 DIAGNOSIS — Z13.220 SCREENING, LIPID: ICD-10-CM

## 2025-04-15 DIAGNOSIS — Z13.1 SCREENING FOR DIABETES MELLITUS: ICD-10-CM

## 2025-04-15 PROBLEM — E78.5 HYPERLIPIDEMIA: Status: ACTIVE | Noted: 2025-04-15

## 2025-04-15 LAB
ALCOHOL URINE: ABNORMAL
AMPHETAMINE SCREEN URINE: POSITIVE
BARBITURATE SCREEN URINE: ABNORMAL
BENZODIAZEPINE SCREEN, URINE: ABNORMAL
BUPRENORPHINE URINE: ABNORMAL
COCAINE METABOLITE SCREEN URINE: ABNORMAL
FENTANYL SCREEN, URINE: ABNORMAL
GABAPENTIN SCREEN, URINE: ABNORMAL
MDMA, URINE: ABNORMAL
METHADONE SCREEN, URINE: ABNORMAL
METHAMPHETAMINE, URINE: ABNORMAL
OPIATE SCREEN URINE: ABNORMAL
OXYCODONE SCREEN URINE: ABNORMAL
PHENCYCLIDINE SCREEN URINE: ABNORMAL
PROPOXYPHENE SCREEN, URINE: ABNORMAL
SYNTHETIC CANNABINOIDS(K2) SCREEN, URINE: ABNORMAL
THC SCREEN, URINE: ABNORMAL
TRAMADOL SCREEN URINE: ABNORMAL
TRICYCLIC ANTIDEPRESSANTS, UR: ABNORMAL

## 2025-04-15 PROCEDURE — G8417 CALC BMI ABV UP PARAM F/U: HCPCS | Performed by: INTERNAL MEDICINE

## 2025-04-15 PROCEDURE — 80305 DRUG TEST PRSMV DIR OPT OBS: CPT | Performed by: INTERNAL MEDICINE

## 2025-04-15 PROCEDURE — 1036F TOBACCO NON-USER: CPT | Performed by: INTERNAL MEDICINE

## 2025-04-15 PROCEDURE — 99214 OFFICE O/P EST MOD 30 MIN: CPT | Performed by: INTERNAL MEDICINE

## 2025-04-15 PROCEDURE — G8427 DOCREV CUR MEDS BY ELIG CLIN: HCPCS | Performed by: INTERNAL MEDICINE

## 2025-04-15 NOTE — PROGRESS NOTES
edema.      Left lower leg: No edema.   Lymphadenopathy:      Cervical: No cervical adenopathy.   Skin:     General: Skin is warm and dry.      Coloration: Skin is not jaundiced or pale.      Findings: No bruising, erythema, lesion or rash.   Neurological:      General: No focal deficit present.      Mental Status: She is alert and oriented to person, place, and time. Mental status is at baseline.      Cranial Nerves: No cranial nerve deficit.      Motor: No weakness or abnormal muscle tone.      Coordination: Coordination normal.      Gait: Gait normal.      Deep Tendon Reflexes: Reflexes are normal and symmetric. Reflexes normal.   Psychiatric:         Mood and Affect: Mood normal.         Behavior: Behavior normal.         Thought Content: Thought content normal.         Judgment: Judgment normal.         1. Attention deficit hyperactivity disorder (ADHD), unspecified ADHD type    2. High risk medication use    3. Screening, lipid    4. Screening for thyroid disorder    5. Screening for diabetes mellitus    6. Other depression    7. Class 2 obesity with body mass index (BMI) of 36.0 to 36.9 in adult, unspecified obesity type, unspecified whether serious comorbidity present        ASSESSMENT/PLAN:    24-year-old woman here for follow-up    1.  ADD: Continue Adderall as prescribed    2.  Depression: Stable on Celexa    3.  Obesity: Patient is interested in losing weight.  However, she does not want to take any medication.  I did give her information on the Mediterranean diet    Valentine was seen today for 3 month follow-up.    Diagnoses and all orders for this visit:    Attention deficit hyperactivity disorder (ADHD), unspecified ADHD type  -     POCT Rapid Drug Screen  -     CBC with Auto Differential; Future  -     Comprehensive Metabolic Panel; Future  -     Hemoglobin A1C; Future  -     Lipid Panel; Future  -     TSH; Future    High risk medication use  -     POCT Rapid Drug Screen    Screening, lipid  -

## 2025-04-16 SDOH — ECONOMIC STABILITY: FOOD INSECURITY: WITHIN THE PAST 12 MONTHS, YOU WORRIED THAT YOUR FOOD WOULD RUN OUT BEFORE YOU GOT MONEY TO BUY MORE.: NEVER TRUE

## 2025-04-16 SDOH — ECONOMIC STABILITY: FOOD INSECURITY: WITHIN THE PAST 12 MONTHS, THE FOOD YOU BOUGHT JUST DIDN'T LAST AND YOU DIDN'T HAVE MONEY TO GET MORE.: NEVER TRUE

## 2025-04-16 ASSESSMENT — ENCOUNTER SYMPTOMS
BACK PAIN: 0
RECTAL PAIN: 0
VOMITING: 0
COUGH: 0
ABDOMINAL PAIN: 0
PHOTOPHOBIA: 0
DIARRHEA: 0
WHEEZING: 0
NAUSEA: 0
FACIAL SWELLING: 0
CHEST TIGHTNESS: 0
COLOR CHANGE: 0
EYE DISCHARGE: 0
RHINORRHEA: 0
EYE PAIN: 0
ABDOMINAL DISTENTION: 0
SHORTNESS OF BREATH: 0
TROUBLE SWALLOWING: 0
SORE THROAT: 0
EYE REDNESS: 0
CHOKING: 0
VOICE CHANGE: 0
SINUS PRESSURE: 0
EYE ITCHING: 0
CONSTIPATION: 0
BLOOD IN STOOL: 0
ANAL BLEEDING: 0

## 2025-04-16 ASSESSMENT — PATIENT HEALTH QUESTIONNAIRE - PHQ9
8. MOVING OR SPEAKING SO SLOWLY THAT OTHER PEOPLE COULD HAVE NOTICED. OR THE OPPOSITE, BEING SO FIGETY OR RESTLESS THAT YOU HAVE BEEN MOVING AROUND A LOT MORE THAN USUAL: NOT AT ALL
SUM OF ALL RESPONSES TO PHQ QUESTIONS 1-9: 0
4. FEELING TIRED OR HAVING LITTLE ENERGY: NOT AT ALL
1. LITTLE INTEREST OR PLEASURE IN DOING THINGS: NOT AT ALL
SUM OF ALL RESPONSES TO PHQ QUESTIONS 1-9: 0
5. POOR APPETITE OR OVEREATING: NOT AT ALL
SUM OF ALL RESPONSES TO PHQ QUESTIONS 1-9: 0
10. IF YOU CHECKED OFF ANY PROBLEMS, HOW DIFFICULT HAVE THESE PROBLEMS MADE IT FOR YOU TO DO YOUR WORK, TAKE CARE OF THINGS AT HOME, OR GET ALONG WITH OTHER PEOPLE: NOT DIFFICULT AT ALL
7. TROUBLE CONCENTRATING ON THINGS, SUCH AS READING THE NEWSPAPER OR WATCHING TELEVISION: NOT AT ALL
SUM OF ALL RESPONSES TO PHQ QUESTIONS 1-9: 0
6. FEELING BAD ABOUT YOURSELF - OR THAT YOU ARE A FAILURE OR HAVE LET YOURSELF OR YOUR FAMILY DOWN: NOT AT ALL
3. TROUBLE FALLING OR STAYING ASLEEP: NOT AT ALL
2. FEELING DOWN, DEPRESSED OR HOPELESS: NOT AT ALL
9. THOUGHTS THAT YOU WOULD BE BETTER OFF DEAD, OR OF HURTING YOURSELF: NOT AT ALL

## 2025-05-06 DIAGNOSIS — F98.8 ATTENTION DEFICIT DISORDER: ICD-10-CM

## 2025-05-06 RX ORDER — DEXTROAMPHETAMINE SACCHARATE, AMPHETAMINE ASPARTATE MONOHYDRATE, DEXTROAMPHETAMINE SULFATE AND AMPHETAMINE SULFATE 1.25; 1.25; 1.25; 1.25 MG/1; MG/1; MG/1; MG/1
1 CAPSULE, EXTENDED RELEASE ORAL DAILY
Qty: 30 CAPSULE | Refills: 0 | Status: SHIPPED | OUTPATIENT
Start: 2025-05-06 | End: 2025-06-05

## 2025-05-06 NOTE — TELEPHONE ENCOUNTER
Valentine Hicks called to request a refill on her medication.      Last office visit : 4/15/2025   Next office visit : 8/15/2025     Last UDS:   Benzodiazepine Screen, Urine   Date Value Ref Range Status   04/15/2025 NEG  Final     Buprenorphine Urine   Date Value Ref Range Status   04/15/2025 NEG  Final     Cocaine Metabolite Screen, Urine   Date Value Ref Range Status   04/15/2025 NEG  Final     Gabapentin Screen, Urine   Date Value Ref Range Status   04/15/2025 NA  Final     Oxycodone Screen, Ur   Date Value Ref Range Status   04/15/2025 NEG  Final     Propoxyphene Screen, Urine   Date Value Ref Range Status   04/15/2025 NEG  Final     THC Screen, Urine   Date Value Ref Range Status   04/15/2025 NEG  Final     Tricyclic Antidepressants, Urine   Date Value Ref Range Status   04/15/2025 NEG  Final   04/23/2024 Negative Negative <300 ng/mL Final       Last Ronald: 4/2/2025  Medication Contract: 08/2/2024     Requested Prescriptions     Pending Prescriptions Disp Refills    amphetamine-dextroamphetamine (ADDERALL XR) 5 MG extended release capsule [Pharmacy Med Name: dextroamphetamine-amphetamine ER 5 mg 24hr capsule,extend release] 30 capsule 0     Sig: Take 1 capsule by mouth daily for 30 days. Max Daily Amount: 5 mg         Please approve or refuse this medication.   Arabella Chaves LPN

## 2025-06-12 DIAGNOSIS — F98.8 ATTENTION DEFICIT DISORDER: ICD-10-CM

## 2025-06-12 RX ORDER — DEXTROAMPHETAMINE SACCHARATE, AMPHETAMINE ASPARTATE MONOHYDRATE, DEXTROAMPHETAMINE SULFATE AND AMPHETAMINE SULFATE 1.25; 1.25; 1.25; 1.25 MG/1; MG/1; MG/1; MG/1
1 CAPSULE, EXTENDED RELEASE ORAL DAILY
Qty: 30 CAPSULE | Refills: 0 | Status: SHIPPED | OUTPATIENT
Start: 2025-06-12 | End: 2025-07-12

## 2025-06-12 NOTE — TELEPHONE ENCOUNTER
Valentine Hicks called to request a refill on her medication.      Last office visit : 4/15/2025   Next office visit : 8/15/2025     Last UDS:   Benzodiazepine Screen, Urine   Date Value Ref Range Status   04/15/2025 NEG  Final     Buprenorphine Urine   Date Value Ref Range Status   04/15/2025 NEG  Final     Cocaine Metabolite Screen, Urine   Date Value Ref Range Status   04/15/2025 NEG  Final     Gabapentin Screen, Urine   Date Value Ref Range Status   04/15/2025 NA  Final     Oxycodone Screen, Ur   Date Value Ref Range Status   04/15/2025 NEG  Final     Propoxyphene Screen, Urine   Date Value Ref Range Status   04/15/2025 NEG  Final     THC Screen, Urine   Date Value Ref Range Status   04/15/2025 NEG  Final     Tricyclic Antidepressants, Urine   Date Value Ref Range Status   04/15/2025 NEG  Final   04/23/2024 Negative Negative <300 ng/mL Final       Last Ronald: 5/6/2025  Medication Contract: 08/2/2024     Requested Prescriptions     Pending Prescriptions Disp Refills    amphetamine-dextroamphetamine (ADDERALL XR) 5 MG extended release capsule [Pharmacy Med Name: dextroamphetamine-amphetamine ER 5 mg 24hr capsule,extend release] 30 capsule 0     Sig: Take 1 capsule by mouth daily.         Please approve or refuse this medication.   Arabella Chaves LPN

## 2025-07-16 DIAGNOSIS — F98.8 ATTENTION DEFICIT DISORDER: ICD-10-CM

## 2025-07-16 NOTE — TELEPHONE ENCOUNTER
Valentine Hicks called to request a refill on her medication.      Last office visit : 4/15/2025   Next office visit : 8/15/2025     Last UDS:   Benzodiazepine Screen, Urine   Date Value Ref Range Status   04/15/2025 NEG  Final     Buprenorphine Urine   Date Value Ref Range Status   04/15/2025 NEG  Final     Cocaine Metabolite Screen, Urine   Date Value Ref Range Status   04/15/2025 NEG  Final     Gabapentin Screen, Urine   Date Value Ref Range Status   04/15/2025 NA  Final     Oxycodone Screen, Ur   Date Value Ref Range Status   04/15/2025 NEG  Final     Propoxyphene Screen, Urine   Date Value Ref Range Status   04/15/2025 NEG  Final     THC Screen, Urine   Date Value Ref Range Status   04/15/2025 NEG  Final     Tricyclic Antidepressants, Urine   Date Value Ref Range Status   04/15/2025 NEG  Final   04/23/2024 Negative Negative <300 ng/mL Final       Last Ronald: 6/12/2025  Medication Contract: 08/2/2024     Requested Prescriptions     Pending Prescriptions Disp Refills    amphetamine-dextroamphetamine (ADDERALL XR) 5 MG extended release capsule [Pharmacy Med Name: dextroamphetamine-amphetamine ER 5 mg 24hr capsule,extend release] 30 capsule 0     Sig: Take 1 capsule by mouth daily.         Please approve or refuse this medication.   Arabella Chaves LPN

## 2025-07-17 RX ORDER — DEXTROAMPHETAMINE SACCHARATE, AMPHETAMINE ASPARTATE MONOHYDRATE, DEXTROAMPHETAMINE SULFATE AND AMPHETAMINE SULFATE 1.25; 1.25; 1.25; 1.25 MG/1; MG/1; MG/1; MG/1
1 CAPSULE, EXTENDED RELEASE ORAL DAILY
Qty: 30 CAPSULE | Refills: 0 | Status: SHIPPED | OUTPATIENT
Start: 2025-07-17 | End: 2025-08-16

## 2025-08-15 ENCOUNTER — OFFICE VISIT (OUTPATIENT)
Dept: INTERNAL MEDICINE | Age: 25
End: 2025-08-15

## 2025-08-15 VITALS
HEIGHT: 61 IN | WEIGHT: 198 LBS | SYSTOLIC BLOOD PRESSURE: 119 MMHG | BODY MASS INDEX: 37.38 KG/M2 | HEART RATE: 104 BPM | OXYGEN SATURATION: 97 % | DIASTOLIC BLOOD PRESSURE: 71 MMHG

## 2025-08-15 DIAGNOSIS — F98.8 ATTENTION DEFICIT DISORDER, UNSPECIFIED TYPE: ICD-10-CM

## 2025-08-15 DIAGNOSIS — Z00.00 ROUTINE ADULT HEALTH MAINTENANCE: Primary | ICD-10-CM

## 2025-08-15 DIAGNOSIS — F32.89 OTHER DEPRESSION: ICD-10-CM

## 2025-08-15 DIAGNOSIS — F41.9 ANXIETY: ICD-10-CM

## 2025-08-15 DIAGNOSIS — Z79.899 HIGH RISK MEDICATION USE: ICD-10-CM

## 2025-08-15 PROCEDURE — 80305 DRUG TEST PRSMV DIR OPT OBS: CPT | Performed by: INTERNAL MEDICINE

## 2025-08-15 PROCEDURE — 99395 PREV VISIT EST AGE 18-39: CPT | Performed by: INTERNAL MEDICINE

## 2025-08-15 RX ORDER — DEXTROAMPHETAMINE SACCHARATE, AMPHETAMINE ASPARTATE MONOHYDRATE, DEXTROAMPHETAMINE SULFATE AND AMPHETAMINE SULFATE 1.25; 1.25; 1.25; 1.25 MG/1; MG/1; MG/1; MG/1
1 CAPSULE, EXTENDED RELEASE ORAL DAILY
Qty: 30 CAPSULE | Refills: 0 | Status: SHIPPED | OUTPATIENT
Start: 2025-08-16 | End: 2025-09-15

## 2025-08-15 RX ORDER — CITALOPRAM HYDROBROMIDE 10 MG/1
10 TABLET ORAL DAILY
Qty: 90 TABLET | Refills: 1 | Status: SHIPPED | OUTPATIENT
Start: 2025-08-15

## 2025-08-16 SDOH — ECONOMIC STABILITY: FOOD INSECURITY: WITHIN THE PAST 12 MONTHS, THE FOOD YOU BOUGHT JUST DIDN'T LAST AND YOU DIDN'T HAVE MONEY TO GET MORE.: NEVER TRUE

## 2025-08-16 SDOH — ECONOMIC STABILITY: FOOD INSECURITY: WITHIN THE PAST 12 MONTHS, YOU WORRIED THAT YOUR FOOD WOULD RUN OUT BEFORE YOU GOT MONEY TO BUY MORE.: NEVER TRUE

## 2025-08-16 ASSESSMENT — PATIENT HEALTH QUESTIONNAIRE - PHQ9
1. LITTLE INTEREST OR PLEASURE IN DOING THINGS: NOT AT ALL
3. TROUBLE FALLING OR STAYING ASLEEP: NOT AT ALL
SUM OF ALL RESPONSES TO PHQ QUESTIONS 1-9: 1
2. FEELING DOWN, DEPRESSED OR HOPELESS: NOT AT ALL
SUM OF ALL RESPONSES TO PHQ QUESTIONS 1-9: 1
6. FEELING BAD ABOUT YOURSELF - OR THAT YOU ARE A FAILURE OR HAVE LET YOURSELF OR YOUR FAMILY DOWN: NOT AT ALL
SUM OF ALL RESPONSES TO PHQ QUESTIONS 1-9: 1
SUM OF ALL RESPONSES TO PHQ QUESTIONS 1-9: 1
8. MOVING OR SPEAKING SO SLOWLY THAT OTHER PEOPLE COULD HAVE NOTICED. OR THE OPPOSITE, BEING SO FIGETY OR RESTLESS THAT YOU HAVE BEEN MOVING AROUND A LOT MORE THAN USUAL: NOT AT ALL
5. POOR APPETITE OR OVEREATING: NOT AT ALL
9. THOUGHTS THAT YOU WOULD BE BETTER OFF DEAD, OR OF HURTING YOURSELF: NOT AT ALL
4. FEELING TIRED OR HAVING LITTLE ENERGY: SEVERAL DAYS
7. TROUBLE CONCENTRATING ON THINGS, SUCH AS READING THE NEWSPAPER OR WATCHING TELEVISION: NOT AT ALL
10. IF YOU CHECKED OFF ANY PROBLEMS, HOW DIFFICULT HAVE THESE PROBLEMS MADE IT FOR YOU TO DO YOUR WORK, TAKE CARE OF THINGS AT HOME, OR GET ALONG WITH OTHER PEOPLE: NOT DIFFICULT AT ALL

## 2025-08-16 ASSESSMENT — ENCOUNTER SYMPTOMS
NAUSEA: 0
SORE THROAT: 0
PHOTOPHOBIA: 0
RECTAL PAIN: 0
SINUS PRESSURE: 0
DIARRHEA: 0
TROUBLE SWALLOWING: 0
COLOR CHANGE: 0
EYE DISCHARGE: 0
VOMITING: 0
EYE ITCHING: 0
ABDOMINAL DISTENTION: 0
ABDOMINAL PAIN: 0
ANAL BLEEDING: 0
BLOOD IN STOOL: 0
BACK PAIN: 0
COUGH: 0
WHEEZING: 0
FACIAL SWELLING: 0
CONSTIPATION: 0
RHINORRHEA: 0
EYE PAIN: 0
VOICE CHANGE: 0
CHOKING: 0
CHEST TIGHTNESS: 0
EYE REDNESS: 0
SHORTNESS OF BREATH: 0

## (undated) DEVICE — MASK ROUND 60MM FPH (10) S/USE: Brand: FISHER & PAYKEL HEALTHCARE

## (undated) DEVICE — BINDER ABD UNIV H9IN WAIST 45-62IN E SFT COT PREM 3 PNL

## (undated) DEVICE — AIR SHEET,LAT,COMFORT GLIDE, BLEND 40X80: Brand: MEDLINE

## (undated) DEVICE — 1LYRTR 16FR10ML 100%SILI SNAP: Brand: MEDLINE INDUSTRIES, INC.

## (undated) DEVICE — STAPLER SKIN SQ 30 ABSRB STPL DISP INSORB ORDER VIA PHONE OR EMAIL

## (undated) DEVICE — ADHESIVE SKIN CLOSURE WND 8.661X1.5 IN 22 CM LIQUIBAND SECUR

## (undated) DEVICE — SUTURE CHROMIC GUT SZ 0 L36IN ABSRB BRN CTX L48MM 1/2 CIR 904H

## (undated) DEVICE — SYRINGE EAR 2OZ ULC ST BLB FLAT BTM PVC BROAD TIP REUSE

## (undated) DEVICE — DISCONTINUED USE 384877 GLOVE SURG SENSICARE LT CF LF PF SZ 7.5

## (undated) DEVICE — SPONGES LAP UN-WASHED 18X18 ST

## (undated) DEVICE — SOLUTION IV 1000ML 0.9% SOD CHL FOR IRRIG PLAS CONT

## (undated) DEVICE — TRAY EPI 25GA 3.5IN 0.75% BIPIVCAIN 8.25% D CONTAIN BPA

## (undated) DEVICE — Device

## (undated) DEVICE — SUTURE VICRYL + SZ 0 L36IN ABSRB UD L48MM CTX 1/2 CIR VCP978H